# Patient Record
Sex: MALE | Race: WHITE | NOT HISPANIC OR LATINO | Employment: OTHER | URBAN - METROPOLITAN AREA
[De-identification: names, ages, dates, MRNs, and addresses within clinical notes are randomized per-mention and may not be internally consistent; named-entity substitution may affect disease eponyms.]

---

## 2018-08-11 ENCOUNTER — APPOINTMENT (OUTPATIENT)
Dept: CT IMAGING | Facility: HOSPITAL | Age: 73
End: 2018-08-11

## 2018-08-11 ENCOUNTER — HOSPITAL ENCOUNTER (OUTPATIENT)
Facility: HOSPITAL | Age: 73
Setting detail: OBSERVATION
Discharge: HOME | End: 2018-08-14
Attending: EMERGENCY MEDICINE | Admitting: EMERGENCY MEDICINE

## 2018-08-11 DIAGNOSIS — M54.59 INTRACTABLE LOW BACK PAIN: ICD-10-CM

## 2018-08-11 DIAGNOSIS — M54.50 ACUTE MIDLINE LOW BACK PAIN WITHOUT SCIATICA: Primary | ICD-10-CM

## 2018-08-11 LAB — GLUCOSE BLDC GLUCOMTR-MCNC: 97 MG/DL (ref 70–130)

## 2018-08-11 PROCEDURE — G0378 HOSPITAL OBSERVATION PER HR: HCPCS

## 2018-08-11 PROCEDURE — 25010000002 HYDROMORPHONE PER 4 MG: Performed by: EMERGENCY MEDICINE

## 2018-08-11 PROCEDURE — 25010000002 ONDANSETRON PER 1 MG: Performed by: EMERGENCY MEDICINE

## 2018-08-11 PROCEDURE — 25010000002 KETOROLAC TROMETHAMINE PER 15 MG: Performed by: EMERGENCY MEDICINE

## 2018-08-11 PROCEDURE — 82962 GLUCOSE BLOOD TEST: CPT

## 2018-08-11 PROCEDURE — 96374 THER/PROPH/DIAG INJ IV PUSH: CPT

## 2018-08-11 PROCEDURE — 96375 TX/PRO/DX INJ NEW DRUG ADDON: CPT

## 2018-08-11 PROCEDURE — 72131 CT LUMBAR SPINE W/O DYE: CPT

## 2018-08-11 PROCEDURE — 99284 EMERGENCY DEPT VISIT MOD MDM: CPT

## 2018-08-11 RX ORDER — CHLORDIAZEPOXIDE HYDROCHLORIDE AND CLIDINIUM BROMIDE 5; 2.5 MG/1; MG/1
1 CAPSULE ORAL AS NEEDED
COMMUNITY
End: 2018-08-14 | Stop reason: HOSPADM

## 2018-08-11 RX ORDER — OXYCODONE HYDROCHLORIDE AND ACETAMINOPHEN 5; 325 MG/1; MG/1
1 TABLET ORAL EVERY 4 HOURS PRN
Status: DISCONTINUED | OUTPATIENT
Start: 2018-08-11 | End: 2018-08-14 | Stop reason: HOSPADM

## 2018-08-11 RX ORDER — KETOROLAC TROMETHAMINE 30 MG/ML
7.5 INJECTION, SOLUTION INTRAMUSCULAR; INTRAVENOUS ONCE
Status: COMPLETED | OUTPATIENT
Start: 2018-08-11 | End: 2018-08-11

## 2018-08-11 RX ORDER — NAPROXEN SODIUM 220 MG
220 TABLET ORAL 2 TIMES DAILY PRN
COMMUNITY
End: 2018-08-14 | Stop reason: HOSPADM

## 2018-08-11 RX ORDER — KETOROLAC TROMETHAMINE 10 MG/1
10 TABLET, FILM COATED ORAL EVERY 8 HOURS PRN
COMMUNITY
End: 2018-08-14 | Stop reason: HOSPADM

## 2018-08-11 RX ORDER — MULTIPLE VITAMINS W/ MINERALS TAB 9MG-400MCG
1 TAB ORAL DAILY
COMMUNITY

## 2018-08-11 RX ORDER — FENOFIBRATE 160 MG/1
160 TABLET ORAL DAILY
COMMUNITY

## 2018-08-11 RX ORDER — TRAMADOL HYDROCHLORIDE 50 MG/1
50 TABLET ORAL EVERY 6 HOURS PRN
COMMUNITY
End: 2018-08-14 | Stop reason: HOSPADM

## 2018-08-11 RX ORDER — SODIUM CHLORIDE 0.9 % (FLUSH) 0.9 %
10 SYRINGE (ML) INJECTION AS NEEDED
Status: DISCONTINUED | OUTPATIENT
Start: 2018-08-11 | End: 2018-08-14 | Stop reason: HOSPADM

## 2018-08-11 RX ORDER — PANTOPRAZOLE SODIUM 40 MG/1
40 TABLET, DELAYED RELEASE ORAL
Status: DISCONTINUED | OUTPATIENT
Start: 2018-08-12 | End: 2018-08-14 | Stop reason: HOSPADM

## 2018-08-11 RX ORDER — LOSARTAN POTASSIUM 50 MG/1
50 TABLET ORAL DAILY
COMMUNITY

## 2018-08-11 RX ORDER — ONDANSETRON 2 MG/ML
4 INJECTION INTRAMUSCULAR; INTRAVENOUS ONCE
Status: COMPLETED | OUTPATIENT
Start: 2018-08-11 | End: 2018-08-11

## 2018-08-11 RX ORDER — CHLORAL HYDRATE 500 MG
1000 CAPSULE ORAL 2 TIMES DAILY
COMMUNITY

## 2018-08-11 RX ORDER — CETIRIZINE HYDROCHLORIDE 10 MG/1
10 TABLET ORAL DAILY
COMMUNITY
End: 2018-08-14 | Stop reason: HOSPADM

## 2018-08-11 RX ORDER — OXYCODONE HYDROCHLORIDE AND ACETAMINOPHEN 5; 325 MG/1; MG/1
2 TABLET ORAL EVERY 4 HOURS PRN
Status: DISCONTINUED | OUTPATIENT
Start: 2018-08-11 | End: 2018-08-14

## 2018-08-11 RX ORDER — ERGOCALCIFEROL 1.25 MG/1
50000 CAPSULE ORAL WEEKLY
COMMUNITY

## 2018-08-11 RX ADMIN — ONDANSETRON 4 MG: 2 INJECTION INTRAMUSCULAR; INTRAVENOUS at 19:55

## 2018-08-11 RX ADMIN — KETOROLAC TROMETHAMINE 7.5 MG: 30 INJECTION, SOLUTION INTRAMUSCULAR at 21:16

## 2018-08-11 RX ADMIN — HYDROMORPHONE HYDROCHLORIDE 0.5 MG: 1 INJECTION, SOLUTION INTRAMUSCULAR; INTRAVENOUS; SUBCUTANEOUS at 19:57

## 2018-08-11 RX ADMIN — OXYCODONE HYDROCHLORIDE AND ACETAMINOPHEN 1 TABLET: 5; 325 TABLET ORAL at 22:44

## 2018-08-11 NOTE — ED NOTES
C/o back pain  Was seen by PCP on Monday, placed on Toradol, and prescribed PT, has not been to PT yet.   Got up today and experienced pain in his lower back and has not been able to ambulate since.   Denies CP, SOB, or any other s/s at this time.   Patient in NAD at this time,VSS,  patient alert, oriented x4.      Ashley Jacobo, NILE  08/11/18 7475

## 2018-08-12 ENCOUNTER — APPOINTMENT (OUTPATIENT)
Dept: MRI IMAGING | Facility: HOSPITAL | Age: 73
End: 2018-08-12

## 2018-08-12 LAB
GLUCOSE BLDC GLUCOMTR-MCNC: 126 MG/DL (ref 70–130)
GLUCOSE BLDC GLUCOMTR-MCNC: 130 MG/DL (ref 70–130)

## 2018-08-12 PROCEDURE — G0378 HOSPITAL OBSERVATION PER HR: HCPCS

## 2018-08-12 PROCEDURE — A9577 INJ MULTIHANCE: HCPCS | Performed by: HOSPITALIST

## 2018-08-12 PROCEDURE — 82565 ASSAY OF CREATININE: CPT

## 2018-08-12 PROCEDURE — 72158 MRI LUMBAR SPINE W/O & W/DYE: CPT

## 2018-08-12 PROCEDURE — 0 GADOBENATE DIMEGLUMINE 529 MG/ML SOLUTION: Performed by: HOSPITALIST

## 2018-08-12 PROCEDURE — 82962 GLUCOSE BLOOD TEST: CPT

## 2018-08-12 RX ORDER — MELATONIN
1000 DAILY
Status: DISCONTINUED | OUTPATIENT
Start: 2018-08-12 | End: 2018-08-14 | Stop reason: HOSPADM

## 2018-08-12 RX ORDER — ASPIRIN 81 MG/1
81 TABLET, CHEWABLE ORAL DAILY
Status: DISCONTINUED | OUTPATIENT
Start: 2018-08-12 | End: 2018-08-14 | Stop reason: HOSPADM

## 2018-08-12 RX ORDER — KETOROLAC TROMETHAMINE 15 MG/ML
7.5 INJECTION, SOLUTION INTRAMUSCULAR; INTRAVENOUS ONCE
Status: DISCONTINUED | OUTPATIENT
Start: 2018-08-12 | End: 2018-08-12

## 2018-08-12 RX ORDER — LOSARTAN POTASSIUM 50 MG/1
50 TABLET ORAL DAILY
Status: DISCONTINUED | OUTPATIENT
Start: 2018-08-12 | End: 2018-08-14 | Stop reason: HOSPADM

## 2018-08-12 RX ORDER — MULTIPLE VITAMINS W/ MINERALS TAB 9MG-400MCG
1 TAB ORAL DAILY
Status: DISCONTINUED | OUTPATIENT
Start: 2018-08-12 | End: 2018-08-14 | Stop reason: HOSPADM

## 2018-08-12 RX ORDER — ONDANSETRON 2 MG/ML
4 INJECTION INTRAMUSCULAR; INTRAVENOUS EVERY 6 HOURS PRN
Status: DISCONTINUED | OUTPATIENT
Start: 2018-08-12 | End: 2018-08-14

## 2018-08-12 RX ADMIN — LOSARTAN POTASSIUM 50 MG: 50 TABLET, FILM COATED ORAL at 18:51

## 2018-08-12 RX ADMIN — PANTOPRAZOLE SODIUM 40 MG: 40 TABLET, DELAYED RELEASE ORAL at 07:58

## 2018-08-12 RX ADMIN — GADOBENATE DIMEGLUMINE 20 ML: 529 INJECTION, SOLUTION INTRAVENOUS at 12:42

## 2018-08-12 RX ADMIN — ASPIRIN 81 MG: 81 TABLET, CHEWABLE ORAL at 18:51

## 2018-08-12 RX ADMIN — MULTIPLE VITAMINS W/ MINERALS TAB 1 TABLET: TAB at 18:51

## 2018-08-12 RX ADMIN — OXYCODONE HYDROCHLORIDE AND ACETAMINOPHEN 1 TABLET: 5; 325 TABLET ORAL at 22:49

## 2018-08-12 RX ADMIN — VITAMIN D, TAB 1000IU (100/BT) 1000 UNITS: 25 TAB at 18:51

## 2018-08-12 RX ADMIN — OXYCODONE HYDROCHLORIDE AND ACETAMINOPHEN 1 TABLET: 5; 325 TABLET ORAL at 13:45

## 2018-08-12 NOTE — H&P
History and physical    Primary care physician  Dr. Ford    Chief complaint  Severe low back pain    History of present illness  73-year-old white male with history of diabetes mellitus hypertension hyperlipidemia osteoarthritis presented to [facility] with severe low back pain started a month ago in which is getting worse not radiating to the left knee.  Patient unable to walk yesterday due to pain.  Patient denies numbness tingling or weakness.  Patient has no loss of control of bowel or bladder.  Patient evaluated in ER with a CT lumbar spine which showed degenerative disc disease but he can't even move admitted for further workup.  Patient denies any fall, injury.  Patient also denies any fever or chills chest pain shortness of breath abdominal pain nausea vomiting diarrhea.    PAST MEDICAL HISTORY    • Arthritis     • Diabetes mellitus (CMS/[facility])     • Hyperlipidemia        PAST SURGICAL HISTORY  Surgical History   History reviewed. No pertinent surgical history.     FAMILY HISTORY  No family history on file.     SOCIAL HISTORY  Social History   Social History            Social History   • Marital status:        Spouse name: N/A   • Number of children: N/A   • Years of education: N/A          Occupational History   • Not on file.           Social History Main Topics   • Smoking status: Not on file   • Smokeless tobacco: Not on file   • Alcohol use Not on file   • Drug use: Unknown   • Sexual activity: Not on file           Other Topics Concern   • Not on file          Social History Narrative   • No narrative on file         ALLERGIES  Patient has no known allergies.    Home medications reviewed     REVIEW OF SYSTEMS  Review of Systems   Constitutional: Negative for activity change, appetite change and fever.   HENT: Negative for congestion and sore throat.    Eyes: Negative.    Respiratory: Negative for cough and shortness of breath.    Cardiovascular: Negative for chest pain and leg  "swelling.   Gastrointestinal: Negative for abdominal pain, diarrhea and vomiting.   Endocrine: Negative.    Genitourinary: Negative for decreased urine volume and dysuria.   Musculoskeletal: Positive for back pain (Lower ). Negative for neck pain.   Skin: Negative for rash and wound.   Allergic/Immunologic: Negative.    Neurological: Negative for weakness, numbness and headaches.   Hematological: Negative.    Psychiatric/Behavioral: Negative.    All other systems reviewed and are negative.     PHYSICAL EXAM  Blood pressure 116/62, pulse 75, temperature 97.5 °F (36.4 °C), temperature source Oral, resp. rate 18, height 177.8 cm (70\"), weight 96.2 kg (212 lb), SpO2 95 %.    Constitutional: No distress.   Head: Normocephalic and atraumatic.   Eyes: EOM are normal.   Neck: Normal range of motion.   Pulmonary/Chest: No respiratory distress.   Abdominal: There is no tenderness.   Musculoskeletal: He exhibits tenderness. He exhibits no edema.   Lower L spine and SI joint tenderness  Normal strength and sensation in BLE, but difficulty lifting L leg due to pain  No saddle anesthesia   Neurological: He is alert.   Skin: Skin is warm and dry.      LABS  Lab Results (last 24 hours)     Procedure Component Value Units Date/Time    POC Glucose Once [[id number]]  (Normal) Collected:  08/11/18 2203    Specimen:  Blood Updated:  08/11/18 2204     Glucose 97 mg/dL     Narrative:       Meter: [id number] : [id number] Finn GONZALEZ        Imaging Results (last 24 hours)     Procedure Component Value Units Date/Time    CT Lumbar Spine Without Contrast [[id number]] Collected:  08/11/18 2034     Updated:  08/11/18 2041    Narrative:       NONCONTRAST CT SCAN LUMBAR SPINE     CLINICAL HISTORY: lbp (no reported trauma history)     COMPARISON: None.     TECHNIQUE: Radiation dose reduction techniques were utilized, including  automated exposure control and exposure modulation based on body size.  Axial noncontrast images of the lumbar " spine were obtained without  contrast. Sagittal reformatted images were supplemented.     FINDINGS:  No acute vertebral fracture identified on the axial series.  There is minimal S-shaped thoracolumbar scoliosis on the coronal  reformats.     There is 2 mm of anterolisthesis at L3-4. The remaining lumbar vertebrae  are well aligned.  No significant compression deformity or retropulsion.     Moderately advanced multilevel spurring is present, more pronounced at  L2-3, L3-4, and L5-S1. There is moderate disc height loss at L5-S1. Mild  disc height loss at L4-5 with vacuum disc phenomenon. Multilevel facet  arthropathy is present, greatest at L3-4 and L4-5. Mild broad-based  protrusions are present throughout. Mild multilevel canal narrowing is  present. Foraminal stenosis is particularly pronounced at L4-5 and L5-S1  bilaterally.     There are nonobstructing right renal calculi incidentally noted..                      Impression:       1. Multilevel degenerative and arthritic changes as discussed, no acute  lumbar spine fracture.  2. Right nephrolithiasis incidentally noted     This report was finalized on 8/11/2018 8:38 PM by Torrey Atkinson M.D.             Current Facility-Administered Medications:   •  cholecalciferol (VITAMIN D3) tablet 1,000 Units, 1,000 Units, Oral, Daily, Edson No MD  •  insulin aspart (novoLOG) injection 0-7 Units, 0-7 Units, Subcutaneous, 4x Daily With Meals & Nightly, Edson No MD  •  losartan (COZAAR) tablet 50 mg, 50 mg, Oral, Daily, Edson No MD  •  multivitamin with minerals 1 tablet, 1 tablet, Oral, Daily, Edson No MD  •  ondansetron (ZOFRAN) injection 4 mg, 4 mg, Intravenous, Q6H PRN, Edson No MD  •  oxyCODONE-acetaminophen (PERCOCET) 5-325 MG per tablet 1 tablet, 1 tablet, Oral, Q4H PRN, 1 tablet at 08/11/18 9368 **OR** oxyCODONE-acetaminophen (PERCOCET) 5-325 MG per tablet 2 tablet, 2 tablet, Oral, Q4H PRN, Edson No MD  •  pantoprazole (PROTONIX) EC  tablet 40 mg, 40 mg, Oral, QAM Marybel CROWE Aftab, MD, 40 mg at 08/12/18 0758  •  Insert peripheral IV, , , Once **AND** sodium chloride 0.9 % flush 10 mL, 10 mL, Intravenous, PRN, Ashish Quach MD     ASSESSMENT  Severe low back pain radiating to left leg consistent with acute lumbar radiculopathy  Degenerative disc disease rule out herniated disc and lumbar stenosis  Diabetes mellitus  Hypertension  Hyperlipidemia  Osteoarthritis  Gastroesophageal reflux disease    PLAN  Admit  Pain management  Check MRI of the lumbar spine  Continue home medication and adjust the doses  Accu-Chek with sliding scale insulin  Stress ulcer DVT prophylaxis  PTOT  Follow closely further recommendation according to hospital course    AZEEM BLANCA MD

## 2018-08-12 NOTE — PLAN OF CARE
Problem: Patient Care Overview  Goal: Plan of Care Review  Outcome: Ongoing (interventions implemented as appropriate)   08/12/18 1901   Coping/Psychosocial   Plan of Care Reviewed With patient   Plan of Care Review   Progress no change   OTHER   Outcome Summary VSS, intermitten complaints of lower back pain, left side. MRI was completed day. PRN PO pain medication ordered for pain control. continue to assess       Problem: Fall Risk (Adult)  Goal: Absence of Fall  Outcome: Ongoing (interventions implemented as appropriate)      Problem: Pain, Acute (Adult)  Goal: Acceptable Pain Control/Comfort Level  Outcome: Ongoing (interventions implemented as appropriate)      Problem: Skin Injury Risk (Adult)  Goal: Skin Health and Integrity  Outcome: Ongoing (interventions implemented as appropriate)

## 2018-08-12 NOTE — PLAN OF CARE
Problem: Patient Care Overview  Goal: Plan of Care Review  Outcome: Ongoing (interventions implemented as appropriate)   08/12/18 0402   Coping/Psychosocial   Plan of Care Reviewed With patient   Plan of Care Review   Progress no change   OTHER   Outcome Summary New admission from ED with lower back pain. Pt unable to walk; he did not fall at home, but was lowered into a chair after his back went out. Pain well managed with prn po pain medication. Plan for MRI in am. Possible d/c today, as he is observation. VSS       Problem: Fall Risk (Adult)  Goal: Absence of Fall  Outcome: Ongoing (interventions implemented as appropriate)      Problem: Pain, Acute (Adult)  Goal: Acceptable Pain Control/Comfort Level  Outcome: Ongoing (interventions implemented as appropriate)      Problem: Skin Injury Risk (Adult)  Goal: Identify Related Risk Factors and Signs and Symptoms  Outcome: Ongoing (interventions implemented as appropriate)    Goal: Skin Health and Integrity  Outcome: Ongoing (interventions implemented as appropriate)

## 2018-08-12 NOTE — PROGRESS NOTES
Discharge Planning Assessment  [facility]     Patient Name: Capo Gonzales  MRN: [id number]  Today's Date: 8/12/2018    Admit Date: 8/11/2018          Discharge Needs Assessment     Row Name 08/12/18 1054       Living Environment    Lives With spouse    Current Living Arrangements home/apartment/condo    Primary Care Provided by self    Provides Primary Care For no one    Family Caregiver if Needed spouse    Family Caregiver Names Wife Treva [phone]    Quality of Family Relationships helpful    Able to Return to Prior Arrangements yes       Resource/Environmental Concerns    Resource/Environmental Concerns none       Transition Planning    Patient/Family Anticipates Transition to home with family    Patient/Family Anticipated Services at Transition none    Transportation Anticipated family or friend will provide       Discharge Needs Assessment    Concerns to be Addressed denies needs/concerns at this time    Equipment Currently Used at Home none    Anticipated Changes Related to Illness none            Discharge Plan     Row Name 08/12/18 1058       Plan    Plan Return home with spouse    Patient/Family in Agreement with Plan yes    Plan Comments Spoke with patient at bedside.  Patient lives with wife Treva [phone] and states she can assist with driving and as needed, is IADL, uses no DME, has never used HH or been to SNF.  Patient plans to return home at DC and does not anticipate any DC needs.  CCP will follow.  BHumeniuk RN        Destination     No service coordination in this encounter.      Durable Medical Equipment     No service coordination in this encounter.      Dialysis/Infusion     No service coordination in this encounter.      Home Medical Care     No service coordination in this encounter.      Social Care     No service coordination in this encounter.                Demographic Summary     Row Name 08/12/18 1052       General Information    Admission Type observation    Arrived From  home    Referral Source admission list    Reason for Consult discharge planning    Preferred Language English     Used During This Interaction no            Functional Status     Row Name 08/12/18 1054       Functional Status    Usual Activity Tolerance good    Current Activity Tolerance fair       Functional Status, IADL    Medications independent    Meal Preparation assistive person    Housekeeping assistive person    Laundry assistive person    Shopping assistive person       Mental Status    General Appearance WDL WDL       Mental Status Summary    Recent Changes in Mental Status/Cognitive Functioning no changes            Psychosocial    No documentation.           Abuse/Neglect    No documentation.           Legal    No documentation.           Substance Abuse    No documentation.           Patient Forms    No documentation.         Becky S. Humeniuk, RN

## 2018-08-12 NOTE — PROGRESS NOTES
Clinical Pharmacy Services: Medication History    Capo Gonzales is a 73 y.o. male presenting to [facility] for   Chief Complaint   Patient presents with   • Back Pain       He  has no past medical history on file.    Allergies as of 08/11/2018   • (No Known Allergies)       Medication information was obtained from: Patient medication list and patient  Pharmacy and Phone Number: [facility] [phone]    Prior to Admission Medications     Prescriptions Last Dose Informant Patient Reported? Taking?    cetirizine (zyrTEC) 10 MG tablet  Self Yes Yes    Take 10 mg by mouth Daily.    clidinium-chlordiazePOXIDE (LIBRAX) 5-2.5 MG per capsule  Self Yes Yes    Take 1 capsule by mouth As Needed for Indigestion.    fenofibrate 160 MG tablet  Self Yes Yes    Take 160 mg by mouth Daily.    ketorolac (TORADOL) 10 MG tablet  Self Yes Yes    Take 10 mg by mouth Every 8 (Eight) Hours As Needed for Moderate Pain .    losartan (COZAAR) 50 MG tablet  Self Yes Yes    Take 50 mg by mouth Daily.    metFORMIN (GLUCOPHAGE) 500 MG tablet  Self Yes Yes    Take 500 mg by mouth 2 (Two) Times a Day With Meals.    Multiple Vitamins-Minerals (MULTIVITAMIN WITH MINERALS) tablet tablet  Self Yes Yes    Take 1 tablet by mouth Daily.    naproxen sodium (ALEVE) 220 MG tablet  Self Yes Yes    Take 220 mg by mouth 2 (Two) Times a Day As Needed.    Omega-3 Fatty Acids (FISH OIL) 1000 MG capsule capsule  Self Yes Yes    Take 1,000 mg by mouth 2 (Two) Times a Day.    traMADol (ULTRAM) 50 MG tablet  Self Yes Yes    Take 50 mg by mouth Every 6 (Six) Hours As Needed for Moderate Pain .    vitamin D (ERGOCALCIFEROL) 83392 units capsule capsule Past Week Self Yes Yes    Take 50,000 Units by mouth 1 (One) Time Per Week. Takes weekly on Wednesdays            Medication notes: Removed tramadol from active medication list because patient states it does nothing for him and he isn't taking it. Also removed ketorolac (patient did not know why it was added to  the list).    This medication list is complete to the best of my knowledge as of 8/11/2018    Please call if questions.    Manuel Roberts, medication history technician.

## 2018-08-13 LAB
ALBUMIN SERPL-MCNC: 3.9 G/DL (ref 3.5–5.2)
ALBUMIN/GLOB SERPL: 1.6 G/DL
ALP SERPL-CCNC: 33 U/L (ref 39–117)
ALT SERPL W P-5'-P-CCNC: 24 U/L (ref 1–41)
ANION GAP SERPL CALCULATED.3IONS-SCNC: 12.6 MMOL/L
AST SERPL-CCNC: 21 U/L (ref 1–40)
BASOPHILS # BLD AUTO: 0.01 10*3/MM3 (ref 0–0.2)
BASOPHILS NFR BLD AUTO: 0.2 % (ref 0–1.5)
BILIRUB SERPL-MCNC: 0.3 MG/DL (ref 0.1–1.2)
BUN BLD-MCNC: 29 MG/DL (ref 8–23)
BUN/CREAT SERPL: 19.9 (ref 7–25)
CALCIUM SPEC-SCNC: 9.5 MG/DL (ref 8.6–10.5)
CHLORIDE SERPL-SCNC: 106 MMOL/L (ref 98–107)
CHOLEST SERPL-MCNC: 124 MG/DL (ref 0–200)
CO2 SERPL-SCNC: 25.4 MMOL/L (ref 22–29)
CREAT BLD-MCNC: 1.46 MG/DL (ref 0.76–1.27)
CRP SERPL-MCNC: 0.04 MG/DL (ref 0–0.5)
DEPRECATED RDW RBC AUTO: 48.8 FL (ref 37–54)
EOSINOPHIL # BLD AUTO: 0.17 10*3/MM3 (ref 0–0.7)
EOSINOPHIL NFR BLD AUTO: 2.8 % (ref 0.3–6.2)
ERYTHROCYTE [DISTWIDTH] IN BLOOD BY AUTOMATED COUNT: 13.8 % (ref 11.5–14.5)
ERYTHROCYTE [SEDIMENTATION RATE] IN BLOOD: 4 MM/HR (ref 0–20)
GFR SERPL CREATININE-BSD FRML MDRD: 47 ML/MIN/1.73
GLOBULIN UR ELPH-MCNC: 2.5 GM/DL
GLUCOSE BLD-MCNC: 106 MG/DL (ref 65–99)
GLUCOSE BLDC GLUCOMTR-MCNC: 131 MG/DL (ref 70–130)
GLUCOSE BLDC GLUCOMTR-MCNC: 177 MG/DL (ref 70–130)
GLUCOSE BLDC GLUCOMTR-MCNC: 96 MG/DL (ref 70–130)
GLUCOSE BLDC GLUCOMTR-MCNC: 98 MG/DL (ref 70–130)
HBA1C MFR BLD: 5.87 % (ref 4.8–5.6)
HCT VFR BLD AUTO: 42.4 % (ref 40.4–52.2)
HDLC SERPL-MCNC: 28 MG/DL (ref 40–60)
HGB BLD-MCNC: 14.1 G/DL (ref 13.7–17.6)
IMM GRANULOCYTES # BLD: 0.01 10*3/MM3 (ref 0–0.03)
IMM GRANULOCYTES NFR BLD: 0.2 % (ref 0–0.5)
LDLC SERPL CALC-MCNC: 69 MG/DL (ref 0–100)
LDLC/HDLC SERPL: 2.46 {RATIO}
LYMPHOCYTES # BLD AUTO: 2.23 10*3/MM3 (ref 0.9–4.8)
LYMPHOCYTES NFR BLD AUTO: 37.1 % (ref 19.6–45.3)
MCH RBC QN AUTO: 32.8 PG (ref 27–32.7)
MCHC RBC AUTO-ENTMCNC: 33.3 G/DL (ref 32.6–36.4)
MCV RBC AUTO: 98.6 FL (ref 79.8–96.2)
MONOCYTES # BLD AUTO: 0.81 10*3/MM3 (ref 0.2–1.2)
MONOCYTES NFR BLD AUTO: 13.5 % (ref 5–12)
NEUTROPHILS # BLD AUTO: 2.79 10*3/MM3 (ref 1.9–8.1)
NEUTROPHILS NFR BLD AUTO: 46.4 % (ref 42.7–76)
NT-PROBNP SERPL-MCNC: 13.9 PG/ML (ref 0–900)
PLATELET # BLD AUTO: 192 10*3/MM3 (ref 140–500)
PMV BLD AUTO: 10.3 FL (ref 6–12)
POTASSIUM BLD-SCNC: 4.4 MMOL/L (ref 3.5–5.2)
PROT SERPL-MCNC: 6.4 G/DL (ref 6–8.5)
RBC # BLD AUTO: 4.3 10*6/MM3 (ref 4.6–6)
SODIUM BLD-SCNC: 144 MMOL/L (ref 136–145)
T3FREE SERPL-MCNC: 2.98 PG/ML (ref 2–4.4)
T4 FREE SERPL-MCNC: 1.22 NG/DL (ref 0.93–1.7)
TRIGL SERPL-MCNC: 136 MG/DL (ref 0–150)
TSH SERPL DL<=0.05 MIU/L-ACNC: 6.83 MIU/ML (ref 0.27–4.2)
VLDLC SERPL-MCNC: 27.2 MG/DL (ref 5–40)
WBC NRBC COR # BLD: 6.01 10*3/MM3 (ref 4.5–10.7)

## 2018-08-13 PROCEDURE — 63710000001 INSULIN ASPART PER 5 UNITS: Performed by: HOSPITALIST

## 2018-08-13 PROCEDURE — 63710000001 METHYLPREDNISOLONE 4 MG TABLET THERAPY PACK 21 EACH DISP PACK: Performed by: NURSE PRACTITIONER

## 2018-08-13 PROCEDURE — G0378 HOSPITAL OBSERVATION PER HR: HCPCS

## 2018-08-13 PROCEDURE — 85025 COMPLETE CBC W/AUTO DIFF WBC: CPT | Performed by: HOSPITALIST

## 2018-08-13 PROCEDURE — 99204 OFFICE O/P NEW MOD 45 MIN: CPT | Performed by: NURSE PRACTITIONER

## 2018-08-13 PROCEDURE — 80061 LIPID PANEL: CPT | Performed by: HOSPITALIST

## 2018-08-13 PROCEDURE — 99220 PR INITIAL OBSERVATION CARE/DAY 70 MINUTES: CPT | Performed by: ORTHOPAEDIC SURGERY

## 2018-08-13 PROCEDURE — 84443 ASSAY THYROID STIM HORMONE: CPT | Performed by: HOSPITALIST

## 2018-08-13 PROCEDURE — 80053 COMPREHEN METABOLIC PANEL: CPT | Performed by: HOSPITALIST

## 2018-08-13 PROCEDURE — 82962 GLUCOSE BLOOD TEST: CPT

## 2018-08-13 PROCEDURE — 96375 TX/PRO/DX INJ NEW DRUG ADDON: CPT

## 2018-08-13 PROCEDURE — 83880 ASSAY OF NATRIURETIC PEPTIDE: CPT | Performed by: HOSPITALIST

## 2018-08-13 PROCEDURE — 83036 HEMOGLOBIN GLYCOSYLATED A1C: CPT | Performed by: HOSPITALIST

## 2018-08-13 PROCEDURE — 85652 RBC SED RATE AUTOMATED: CPT | Performed by: HOSPITALIST

## 2018-08-13 PROCEDURE — 86140 C-REACTIVE PROTEIN: CPT | Performed by: HOSPITALIST

## 2018-08-13 PROCEDURE — 25010000002 DEXAMETHASONE PER 1 MG: Performed by: NURSE PRACTITIONER

## 2018-08-13 PROCEDURE — 84481 FREE ASSAY (FT-3): CPT | Performed by: HOSPITALIST

## 2018-08-13 PROCEDURE — 96361 HYDRATE IV INFUSION ADD-ON: CPT

## 2018-08-13 PROCEDURE — 84439 ASSAY OF FREE THYROXINE: CPT | Performed by: HOSPITALIST

## 2018-08-13 RX ORDER — METHYLPREDNISOLONE 4 MG/1
8 TABLET ORAL
Status: DISCONTINUED | OUTPATIENT
Start: 2018-08-14 | End: 2018-08-14 | Stop reason: HOSPADM

## 2018-08-13 RX ORDER — METHYLPREDNISOLONE 4 MG/1
4 TABLET ORAL
Status: DISCONTINUED | OUTPATIENT
Start: 2018-08-18 | End: 2018-08-14 | Stop reason: HOSPADM

## 2018-08-13 RX ORDER — METHYLPREDNISOLONE 4 MG/1
4 TABLET ORAL
Status: COMPLETED | OUTPATIENT
Start: 2018-08-13 | End: 2018-08-13

## 2018-08-13 RX ORDER — METHYLPREDNISOLONE 4 MG/1
4 TABLET ORAL
Status: DISCONTINUED | OUTPATIENT
Start: 2018-08-16 | End: 2018-08-14 | Stop reason: HOSPADM

## 2018-08-13 RX ORDER — METHYLPREDNISOLONE 4 MG/1
4 TABLET ORAL
Status: DISCONTINUED | OUTPATIENT
Start: 2018-08-14 | End: 2018-08-14 | Stop reason: HOSPADM

## 2018-08-13 RX ORDER — METHYLPREDNISOLONE 4 MG/1
4 TABLET ORAL 2 TIMES DAILY
Status: DISCONTINUED | OUTPATIENT
Start: 2018-08-17 | End: 2018-08-14 | Stop reason: HOSPADM

## 2018-08-13 RX ORDER — SODIUM CHLORIDE 450 MG/100ML
75 INJECTION, SOLUTION INTRAVENOUS CONTINUOUS
Status: DISCONTINUED | OUTPATIENT
Start: 2018-08-13 | End: 2018-08-14

## 2018-08-13 RX ORDER — METHYLPREDNISOLONE 4 MG/1
8 TABLET ORAL
Status: COMPLETED | OUTPATIENT
Start: 2018-08-13 | End: 2018-08-13

## 2018-08-13 RX ORDER — METHYLPREDNISOLONE 4 MG/1
4 TABLET ORAL
Status: DISCONTINUED | OUTPATIENT
Start: 2018-08-15 | End: 2018-08-14 | Stop reason: HOSPADM

## 2018-08-13 RX ORDER — DEXAMETHASONE SODIUM PHOSPHATE 4 MG/ML
6 VIAL (ML) INJECTION ONCE
Status: COMPLETED | OUTPATIENT
Start: 2018-08-13 | End: 2018-08-13

## 2018-08-13 RX ADMIN — DEXAMETHASONE SODIUM PHOSPHATE 6 MG: 4 INJECTION, SOLUTION INTRAMUSCULAR; INTRAVENOUS at 17:25

## 2018-08-13 RX ADMIN — METHYLPREDNISOLONE 8 MG: 4 TABLET ORAL at 17:26

## 2018-08-13 RX ADMIN — METHYLPREDNISOLONE 4 MG: 4 TABLET ORAL at 18:04

## 2018-08-13 RX ADMIN — INSULIN ASPART 2 UNITS: 100 INJECTION, SOLUTION INTRAVENOUS; SUBCUTANEOUS at 20:54

## 2018-08-13 RX ADMIN — PANTOPRAZOLE SODIUM 40 MG: 40 TABLET, DELAYED RELEASE ORAL at 06:50

## 2018-08-13 RX ADMIN — MULTIPLE VITAMINS W/ MINERALS TAB 1 TABLET: TAB at 08:54

## 2018-08-13 RX ADMIN — ASPIRIN 81 MG: 81 TABLET, CHEWABLE ORAL at 08:54

## 2018-08-13 RX ADMIN — METHYLPREDNISOLONE 4 MG: 4 TABLET ORAL at 18:51

## 2018-08-13 RX ADMIN — SODIUM CHLORIDE 75 ML/HR: 4.5 INJECTION, SOLUTION INTRAVENOUS at 16:33

## 2018-08-13 RX ADMIN — LOSARTAN POTASSIUM 50 MG: 50 TABLET, FILM COATED ORAL at 08:55

## 2018-08-13 RX ADMIN — METHYLPREDNISOLONE 8 MG: 4 TABLET ORAL at 20:54

## 2018-08-13 RX ADMIN — VITAMIN D, TAB 1000IU (100/BT) 1000 UNITS: 25 TAB at 08:54

## 2018-08-13 NOTE — PROGRESS NOTES
"Daily progress note    Chief complaint  Doing little better  Still with severe pain but no numbness tingling or weakness    History of present illness  73-year-old white male with history of diabetes mellitus hypertension hyperlipidemia osteoarthritis presented to [facility] with severe low back pain started a month ago in which is getting worse not radiating to the left knee.  Patient unable to walk yesterday due to pain.  Patient denies numbness tingling or weakness.  Patient has no loss of control of bowel or bladder.  Patient evaluated in ER with a CT lumbar spine which showed degenerative disc disease but he can't even move admitted for further workup.  Patient denies any fall, injury.  Patient also denies any fever or chills chest pain shortness of breath abdominal pain nausea vomiting diarrhea.     REVIEW OF SYSTEMS  Review of Systems   Constitutional: Negative for activity change, appetite change and fever.   HENT: Negative for congestion and sore throat.    Eyes: Negative.    Respiratory: Negative for cough and shortness of breath.    Cardiovascular: Negative for chest pain and leg swelling.   Gastrointestinal: Negative for abdominal pain, diarrhea and vomiting.   Endocrine: Negative.    Genitourinary: Negative for decreased urine volume and dysuria.   Musculoskeletal: Positive for back pain (Lower ). Negative for neck pain.   Skin: Negative for rash and wound.   Allergic/Immunologic: Negative.    Neurological: Negative for weakness, numbness and headaches.   Hematological: Negative.    Psychiatric/Behavioral: Negative.    All other systems reviewed and are negative.     PHYSICAL EXAM  Blood pressure 121/71, pulse 84, temperature 98.1 °F (36.7 °C), temperature source Oral, resp. rate 17, height 177.8 cm (70\"), weight 96.2 kg (212 lb), SpO2 92 %.    Constitutional: No distress.   Head: Normocephalic and atraumatic.   Eyes: EOM are normal.   Neck: Normal range of motion.   Pulmonary/Chest: No " respiratory distress.   Abdominal: There is no tenderness.   Musculoskeletal: He exhibits tenderness. He exhibits no edema.   Lower L spine and SI joint tenderness  Normal strength and sensation in BLE, but difficulty lifting L leg due to pain  No saddle anesthesia   Neurological: He is alert.   Skin: Skin is warm and dry.      LABS  Lab Results (last 24 hours)     Procedure Component Value Units Date/Time    POC Glucose Once [[id number]]  (Normal) Collected:  08/13/18 1103    Specimen:  Blood Updated:  08/13/18 1105     Glucose 98 mg/dL     Narrative:       Meter: [id number] : [id number] Daniel Zenaida NA    POC Glucose Once [[id number]]  (Normal) Collected:  08/13/18 0722    Specimen:  Blood Updated:  08/13/18 0724     Glucose 96 mg/dL     Narrative:       Meter: [id number] : [id number] Daniel Zenaida NA    Sedimentation Rate [[id number]]  (Normal) Collected:  08/13/18 0543    Specimen:  Blood Updated:  08/13/18 0722     Sed Rate 4 mm/hr     BNP [[id number]]  (Normal) Collected:  08/13/18 0543    Specimen:  Blood Updated:  08/13/18 0710     proBNP 13.9 pg/mL     Narrative:       Among patients with dyspnea, NT-proBNP is highly sensitive for the detection of acute congestive heart failure. In addition NT-proBNP of <300 pg/ml effectively rules out acute congestive heart failure with 99% negative predictive value.    TSH [[id number]]  (Abnormal) Collected:  08/13/18 0543    Specimen:  Blood Updated:  08/13/18 0710     TSH 6.830 (H) mIU/mL     Comprehensive Metabolic Panel [[id number]]  (Abnormal) Collected:  08/13/18 0543    Specimen:  Blood Updated:  08/13/18 0705     Glucose 106 (H) mg/dL      BUN 29 (H) mg/dL      Creatinine 1.46 (H) mg/dL      Sodium 144 mmol/L      Potassium 4.4 mmol/L      Chloride 106 mmol/L      CO2 25.4 mmol/L      Calcium 9.5 mg/dL      Total Protein 6.4 g/dL      Albumin 3.90 g/dL      ALT (SGPT) 24 U/L      AST (SGOT) 21 U/L      Alkaline Phosphatase 33 (L) U/L      Total Bilirubin 0.3  mg/dL      eGFR Non  Amer 47 (L) mL/min/1.73      Globulin 2.5 gm/dL      A/G Ratio 1.6 g/dL      BUN/Creatinine Ratio 19.9     Anion Gap 12.6 mmol/L     Narrative:       The MDRD GFR formula is only valid for adults with stable renal function between ages 18 and 70.    Lipid Panel [[id number]]  (Abnormal) Collected:  08/13/18 0543    Specimen:  Blood Updated:  08/13/18 0705     Total Cholesterol 124 mg/dL      Triglycerides 136 mg/dL      HDL Cholesterol 28 (L) mg/dL      LDL Cholesterol  69 mg/dL      VLDL Cholesterol 27.2 mg/dL      LDL/HDL Ratio 2.46    Narrative:       Cholesterol Reference Ranges  (U.S. Department of Health and Human Services ATP III Classifications)    Desirable          <200 mg/dL  Borderline High    200-239 mg/dL  High Risk          >240 mg/dL      Triglyceride Reference Ranges  (U.S. Department of Health and Human Services ATP III Classifications)    Normal           <150 mg/dL  Borderline High  150-199 mg/dL  High             200-499 mg/dL  Very High        >500 mg/dL    HDL Reference Ranges  (U.S. Department of Health and Human Services ATP III Classifcations)    Low     <40 mg/dl (major risk factor for CHD)  High    >60 mg/dl ('negative' risk factor for CHD)        LDL Reference Ranges  (U.S. Department of Health and Human Services ATP III Classifcations)    Optimal          <100 mg/dL  Near Optimal     100-129 mg/dL  Borderline High  130-159 mg/dL  High             160-189 mg/dL  Very High        >189 mg/dL    C-reactive Protein [[id number]]  (Normal) Collected:  08/13/18 0543    Specimen:  Blood Updated:  08/13/18 0705     C-Reactive Protein 0.04 mg/dL     Hemoglobin A1c [[id number]]  (Abnormal) Collected:  08/13/18 0543    Specimen:  Blood Updated:  08/13/18 0634     Hemoglobin A1C 5.87 (H) %     Narrative:       Hemoglobin A1C Ranges:    Increased Risk for Diabetes  5.7% to 6.4%  Diabetes                     >= 6.5%  Diabetic Goal                < 7.0%    CBC & Differential  [[id number]] Collected:  08/13/18 0543    Specimen:  Blood Updated:  08/13/18 0632    Narrative:       The following orders were created for panel order CBC & Differential.  Procedure                               Abnormality         Status                     ---------                               -----------         ------                     CBC Auto Differential[[id number]]        Abnormal            Final result                 Please view results for these tests on the individual orders.    CBC Auto Differential [[id number]]  (Abnormal) Collected:  08/13/18 0543    Specimen:  Blood Updated:  08/13/18 0632     WBC 6.01 10*3/mm3      RBC 4.30 (L) 10*6/mm3      Hemoglobin 14.1 g/dL      Hematocrit 42.4 %      MCV 98.6 (H) fL      MCH 32.8 (H) pg      MCHC 33.3 g/dL      RDW 13.8 %      RDW-SD 48.8 fl      MPV 10.3 fL      Platelets 192 10*3/mm3      Neutrophil % 46.4 %      Lymphocyte % 37.1 %      Monocyte % 13.5 (H) %      Eosinophil % 2.8 %      Basophil % 0.2 %      Immature Grans % 0.2 %      Neutrophils, Absolute 2.79 10*3/mm3      Lymphocytes, Absolute 2.23 10*3/mm3      Monocytes, Absolute 0.81 10*3/mm3      Eosinophils, Absolute 0.17 10*3/mm3      Basophils, Absolute 0.01 10*3/mm3      Immature Grans, Absolute 0.01 10*3/mm3     POC Glucose Once [[id number]]  (Normal) Collected:  08/12/18 2048    Specimen:  Blood Updated:  08/12/18 2109     Glucose 130 mg/dL     Narrative:       Meter: [id number] : [id number] Delfino OLSEN    POC Glucose Once [[id number]]  (Normal) Collected:  08/12/18 1759    Specimen:  Blood Updated:  08/12/18 1800     Glucose 126 mg/dL     Narrative:       Meter: [id number] : [id number] Beto GONZALEZ        Imaging Results (last 72 hours)     Procedure Component Value Units Date/Time    MRI Lumbar Spine With & Without Contrast [[id number]] Collected:  08/12/18 1334     Updated:  08/12/18 1359    Narrative:       MR SCAN OF THE LUMBAR SPINE WITHOUT AND WITH INTRAVENOUS  CONTRAST     HISTORY: Low back pain extending into left leg. Sudden onset after  bending over.     FINDINGS:  The MR scan was performed with sagittal and axial images and  includes T1 images without and with intravenous contrast. The following  findings are present:  1. There are numerous cysts within the partially visualized kidneys,  particularly on the right. The appearance is concerning for polycystic  renal disease and further clinical correlation is recommended.  2. The conus appears normal. There is no significant disc or bony  abnormality at T11-12 or T12-L1. At L1-2, there is no disc abnormality.  There is slight facet hypertrophy without central or foraminal  encroachment.  3. At L2-3, there is no disc abnormality. There is moderate facet  hypertrophy without central or foraminal encroachment.  4. At L3-4, there is a combination of very severe facet spurring and  slight anterior subluxation of L3 measuring 4 mm with unroofing of the  disc and generalized disc bulge. This combination produces moderate  central stenosis. There is no foraminal encroachment.  5. At L4-5, there is mild to moderate broad-based disc bulge combined  with considerable facet spurring. There is no central stenosis, but the  degree of facet spurring causes some slight bilateral foraminal  encroachment.  6. At L5-S1, there is a slight central disc bulge and there is slight  facet hypertrophy and spurring. There is no central or foraminal  encroachment.     This report was finalized on 8/12/2018 1:56 PM by Dr. Brian Obregon M.D.       CT Lumbar Spine Without Contrast [[id number]] Collected:  08/11/18 2034     Updated:  08/11/18 2041    Narrative:       NONCONTRAST CT SCAN LUMBAR SPINE     CLINICAL HISTORY: lbp (no reported trauma history)     COMPARISON: None.     TECHNIQUE: Radiation dose reduction techniques were utilized, including  automated exposure control and exposure modulation based on body size.  Axial noncontrast images of  the lumbar spine were obtained without  contrast. Sagittal reformatted images were supplemented.     FINDINGS:  No acute vertebral fracture identified on the axial series.  There is minimal S-shaped thoracolumbar scoliosis on the coronal  reformats.     There is 2 mm of anterolisthesis at L3-4. The remaining lumbar vertebrae  are well aligned.  No significant compression deformity or retropulsion.     Moderately advanced multilevel spurring is present, more pronounced at  L2-3, L3-4, and L5-S1. There is moderate disc height loss at L5-S1. Mild  disc height loss at L4-5 with vacuum disc phenomenon. Multilevel facet  arthropathy is present, greatest at L3-4 and L4-5. Mild broad-based  protrusions are present throughout. Mild multilevel canal narrowing is  present. Foraminal stenosis is particularly pronounced at L4-5 and L5-S1  bilaterally.     There are nonobstructing right renal calculi incidentally noted..                      Impression:       1. Multilevel degenerative and arthritic changes as discussed, no acute  lumbar spine fracture.  2. Right nephrolithiasis incidentally noted     This report was finalized on 8/11/2018 8:38 PM by Torrey Atkinson M.D.             Current Facility-Administered Medications:   •  aspirin chewable tablet 81 mg, 81 mg, Oral, Daily, Edson No MD, 81 mg at 08/13/18 0854  •  cholecalciferol (VITAMIN D3) tablet 1,000 Units, 1,000 Units, Oral, Daily, Edson No MD, 1,000 Units at 08/13/18 0854  •  insulin aspart (novoLOG) injection 0-7 Units, 0-7 Units, Subcutaneous, 4x Daily With Meals & Nightly, Edson No MD  •  losartan (COZAAR) tablet 50 mg, 50 mg, Oral, Daily, Edson No MD, 50 mg at 08/13/18 0855  •  multivitamin with minerals 1 tablet, 1 tablet, Oral, Daily, Edson No MD, 1 tablet at 08/13/18 0854  •  ondansetron (ZOFRAN) injection 4 mg, 4 mg, Intravenous, Q6H PRN, Edson No MD  •  oxyCODONE-acetaminophen (PERCOCET) 5-325 MG per tablet 1 tablet, 1 tablet,  Oral, Q4H PRN, 1 tablet at 08/12/18 8489 **OR** oxyCODONE-acetaminophen (PERCOCET) 5-325 MG per tablet 2 tablet, 2 tablet, Oral, Q4H PRN, Azeem No MD  •  pantoprazole (PROTONIX) EC tablet 40 mg, 40 mg, Oral, QAM AC, Azeem No MD, 40 mg at 08/13/18 0650  •  Insert peripheral IV, , , Once **AND** sodium chloride 0.9 % flush 10 mL, 10 mL, Intravenous, PRN, Ashish Quach MD     ASSESSMENT  Severe low back pain radiating to left leg withL4-L5 disc bulge and lumbar stenosis  Degenerative disc disease   Diabetes mellitus  Hypertension  Hyperlipidemia  Osteoarthritis  Gastroesophageal reflux disease    PLAN  CPM  Pain management  Spine surgery consult  Continue home medication and adjust the doses  Accu-Chek with sliding scale insulin  Stress ulcer DVT prophylaxis  PTOT  Follow closely further recommendation according to hospital course    AZEEM NO MD

## 2018-08-13 NOTE — CONSULTS
"Patient name: Capo Gonzales  Referring Provider: JESSE Albrecht  Reason for Consultation: low back pain    Patient Care Team:  Andreas Ford MD as PCP - General (Family Medicine)    Chief complaint: Intractable low back pain    Subjective .     History of present illness:    Patient is a 73 y.o. right handed male who presents with progressively worsening and intractable low back pain that started approximately 1 month ago.  He has been followed by his PCP for the issue and has undergone multiple x-rays.  He was recently referred to physical therapy, which she has not started.  Yesterday afternoon, he was bending over changing the oil in his car when the back pain worsened.  He reports that it was the worst pain he has ever felt, \"worse than passing kidney stones.\" He came to the ER for further evaluation.    He denies any pain, numbness, tingling or weakness radiating down either leg.  Pain radiates from the left low back into the left buttock.  He reports it is currently much better now than it was when he came to the ER.  He is able to walk without difficulty.  He denies any bowel or bladder incontinence.  He was taking Aleve at home which did help until the pain became more severe.      /71 (BP Location: Left arm, Patient Position: Sitting)   Pulse 84   Temp 98.1 °F (36.7 °C) (Oral)   Resp 17   Ht 177.8 cm (70\")   Wt 96.2 kg (212 lb)   SpO2 92%   BMI 30.42 kg/m²       Review of Systems  Review of Systems   Constitutional: Positive for activity change.   HENT: Negative.    Eyes: Negative.    Respiratory: Negative.    Cardiovascular: Negative.    Gastrointestinal: Negative.    Endocrine: Negative.    Genitourinary: Negative.    Musculoskeletal: Positive for arthralgias, back pain, gait problem and myalgias.   Skin: Negative.    Neurological: Negative for tremors and weakness.   Hematological: Negative.    Psychiatric/Behavioral: Negative.        History  PAST MEDICAL HISTORY  Past Medical " History:   Diagnosis Date   • Arthritis    • Diabetes mellitus (CMS/HCC)    • Hyperlipidemia    • Hypertension        PAST SURGICAL HISTORY  History reviewed. No pertinent surgical history.    FAMILY HISTORY  History reviewed. No pertinent family history.    SOCIAL HISTORY  Social History   Substance Use Topics   • Smoking status: Never Smoker   • Smokeless tobacco: Never Used   • Alcohol use No       retired      Allergies:  Patient has no known allergies.    MEDICATIONS:  Prescriptions Prior to Admission   Medication Sig Dispense Refill Last Dose   • cetirizine (zyrTEC) 10 MG tablet Take 10 mg by mouth Daily.      • clidinium-chlordiazePOXIDE (LIBRAX) 5-2.5 MG per capsule Take 1 capsule by mouth As Needed for Indigestion.      • fenofibrate 160 MG tablet Take 160 mg by mouth Daily.      • ketorolac (TORADOL) 10 MG tablet Take 10 mg by mouth Every 8 (Eight) Hours As Needed for Moderate Pain .      • losartan (COZAAR) 50 MG tablet Take 50 mg by mouth Daily.      • metFORMIN (GLUCOPHAGE) 500 MG tablet Take 500 mg by mouth 2 (Two) Times a Day With Meals.      • Multiple Vitamins-Minerals (MULTIVITAMIN WITH MINERALS) tablet tablet Take 1 tablet by mouth Daily.      • naproxen sodium (ALEVE) 220 MG tablet Take 220 mg by mouth 2 (Two) Times a Day As Needed.      • Omega-3 Fatty Acids (FISH OIL) 1000 MG capsule capsule Take 1,000 mg by mouth 2 (Two) Times a Day.      • traMADol (ULTRAM) 50 MG tablet Take 50 mg by mouth Every 6 (Six) Hours As Needed for Moderate Pain .      • vitamin D (ERGOCALCIFEROL) 06727 units capsule capsule Take 50,000 Units by mouth 1 (One) Time Per Week. Takes weekly on Wednesdays   Past Week at Unknown time       Objective     Results Review:  LABS:    Admission on 08/11/2018   Component Date Value Ref Range Status   • Glucose 08/11/2018 97  70 - 130 mg/dL Final   • Glucose 08/12/2018 126  70 - 130 mg/dL Final   • Glucose 08/12/2018 130  70 - 130 mg/dL Final   • Glucose 08/13/2018 106* 65 -  99 mg/dL Final   • BUN 08/13/2018 29* 8 - 23 mg/dL Final   • Creatinine 08/13/2018 1.46* 0.76 - 1.27 mg/dL Final   • Sodium 08/13/2018 144  136 - 145 mmol/L Final   • Potassium 08/13/2018 4.4  3.5 - 5.2 mmol/L Final   • Chloride 08/13/2018 106  98 - 107 mmol/L Final   • CO2 08/13/2018 25.4  22.0 - 29.0 mmol/L Final   • Calcium 08/13/2018 9.5  8.6 - 10.5 mg/dL Final   • Total Protein 08/13/2018 6.4  6.0 - 8.5 g/dL Final   • Albumin 08/13/2018 3.90  3.50 - 5.20 g/dL Final   • ALT (SGPT) 08/13/2018 24  1 - 41 U/L Final   • AST (SGOT) 08/13/2018 21  1 - 40 U/L Final   • Alkaline Phosphatase 08/13/2018 33* 39 - 117 U/L Final   • Total Bilirubin 08/13/2018 0.3  0.1 - 1.2 mg/dL Final   • eGFR Non African Amer 08/13/2018 47* >60 mL/min/1.73 Final   • Globulin 08/13/2018 2.5  gm/dL Final   • A/G Ratio 08/13/2018 1.6  g/dL Final   • BUN/Creatinine Ratio 08/13/2018 19.9  7.0 - 25.0 Final   • Anion Gap 08/13/2018 12.6  mmol/L Final   • proBNP 08/13/2018 13.9  0.0 - 900.0 pg/mL Final   • TSH 08/13/2018 6.830* 0.270 - 4.200 mIU/mL Final   • Total Cholesterol 08/13/2018 124  0 - 200 mg/dL Final   • Triglycerides 08/13/2018 136  0 - 150 mg/dL Final   • HDL Cholesterol 08/13/2018 28* 40 - 60 mg/dL Final   • LDL Cholesterol  08/13/2018 69  0 - 100 mg/dL Final   • VLDL Cholesterol 08/13/2018 27.2  5 - 40 mg/dL Final   • LDL/HDL Ratio 08/13/2018 2.46   Final   • Hemoglobin A1C 08/13/2018 5.87* 4.80 - 5.60 % Final   • Sed Rate 08/13/2018 4  0 - 20 mm/hr Final   • C-Reactive Protein 08/13/2018 0.04  0.00 - 0.50 mg/dL Final   • WBC 08/13/2018 6.01  4.50 - 10.70 10*3/mm3 Final   • RBC 08/13/2018 4.30* 4.60 - 6.00 10*6/mm3 Final   • Hemoglobin 08/13/2018 14.1  13.7 - 17.6 g/dL Final   • Hematocrit 08/13/2018 42.4  40.4 - 52.2 % Final   • MCV 08/13/2018 98.6* 79.8 - 96.2 fL Final   • MCH 08/13/2018 32.8* 27.0 - 32.7 pg Final   • MCHC 08/13/2018 33.3  32.6 - 36.4 g/dL Final   • RDW 08/13/2018 13.8  11.5 - 14.5 % Final   • RDW-SD 08/13/2018  48.8  37.0 - 54.0 fl Final   • MPV 08/13/2018 10.3  6.0 - 12.0 fL Final   • Platelets 08/13/2018 192  140 - 500 10*3/mm3 Final   • Neutrophil % 08/13/2018 46.4  42.7 - 76.0 % Final   • Lymphocyte % 08/13/2018 37.1  19.6 - 45.3 % Final   • Monocyte % 08/13/2018 13.5* 5.0 - 12.0 % Final   • Eosinophil % 08/13/2018 2.8  0.3 - 6.2 % Final   • Basophil % 08/13/2018 0.2  0.0 - 1.5 % Final   • Immature Grans % 08/13/2018 0.2  0.0 - 0.5 % Final   • Neutrophils, Absolute 08/13/2018 2.79  1.90 - 8.10 10*3/mm3 Final   • Lymphocytes, Absolute 08/13/2018 2.23  0.90 - 4.80 10*3/mm3 Final   • Monocytes, Absolute 08/13/2018 0.81  0.20 - 1.20 10*3/mm3 Final   • Eosinophils, Absolute 08/13/2018 0.17  0.00 - 0.70 10*3/mm3 Final   • Basophils, Absolute 08/13/2018 0.01  0.00 - 0.20 10*3/mm3 Final   • Immature Grans, Absolute 08/13/2018 0.01  0.00 - 0.03 10*3/mm3 Final   • Glucose 08/13/2018 96  70 - 130 mg/dL Final   • Glucose 08/13/2018 98  70 - 130 mg/dL Final       DIAGNOSTICS:  MRI of the lumbar spine with and without contrast reveals significant degenerative arthritic changes throughout.  However, there is no significant neuroforaminal narrowing at any level resulting in nerve impingement    Results Review:   I reviewed the patient's new clinical results.  I personally viewed and interpreted the patient's MRI lumbar spine with and without contrast, as reviewed and discussed with Dr. Sifuentes      Vital Signs   Temp:  [98.1 °F (36.7 °C)-98.4 °F (36.9 °C)] 98.1 °F (36.7 °C)  Heart Rate:  [75-84] 84  Resp:  [17-18] 17  BP: (102-128)/(52-71) 121/71    Physical Exam:  Physical Exam   Constitutional: He is oriented to person, place, and time. Vital signs are normal. He appears well-developed and well-nourished. He is cooperative.   Very pleasant older gentleman   HENT:   Head: Normocephalic and atraumatic.   Eyes: Pupils are equal, round, and reactive to light. EOM are normal.   Neck: Neck supple.   Cardiovascular: Intact distal pulses.     Pulmonary/Chest: Effort normal and breath sounds normal.   Abdominal: Soft.   Musculoskeletal: Normal range of motion. He exhibits tenderness (Minimal tenderness to firm palpation left sciatic notch, nontender diffuse lumbar spine and SI joint). He exhibits no edema or deformity.        Lumbar back: He exhibits normal range of motion, no bony tenderness, no swelling, no edema, no laceration and no pain.   Strength equal bilateral lower extremities with normal muscle bulk and tone  Severe, reproducible pain with left hip abduction and abduction- positive Wilver's  Able to move and turn easily in bed without obvious discomfort     Neurological: He is alert and oriented to person, place, and time. He has normal strength. He displays no tremor and normal reflexes. No cranial nerve deficit or sensory deficit. He exhibits normal muscle tone. Coordination normal. GCS eye subscore is 4. GCS verbal subscore is 5. GCS motor subscore is 6.   Reflex Scores:       Patellar reflexes are 2+ on the right side and 2+ on the left side.       Achilles reflexes are 2+ on the right side and 2+ on the left side.  Sensory intact bilateral lower extremities to soft touch  Normal proprioception  Normal deep tendon reflexes  Negative clonus  Normal motor exam   Skin: Skin is warm and dry.   Psychiatric: He has a normal mood and affect. His behavior is normal. Judgment and thought content normal.   Vitals reviewed.    Neurologic Exam     Mental Status   Oriented to person, place, and time.     Cranial Nerves     CN III, IV, VI   Pupils are equal, round, and reactive to light.  Extraocular motions are normal.     Motor Exam     Strength   Strength 5/5 throughout.     Gait, Coordination, and Reflexes     Reflexes   Right patellar: 2+  Left patellar: 2+  Right achilles: 2+  Left achilles: 2+      Assessment/Plan     Active Problems:    Acute midline low back pain without sciatica      PLAN: He presents with 1 month of waxing and waning left  sided low back and buttock pain that progressively worsened yesterday.  MRI of the lumbar spine shows significant arthritis throughout, but no evidence of nerve impingement.  He has no myelopathic findings on exam.  He has no radiation of pain into his legs.  No need for any neurosurgical intervention.  I have ordered one dose of IV steroids followed by a Medrol Dosepak to help with any inflammatory discomfort.    As noted above, he does have reproducible pain with a left hip abduction and abduction that would likely warrant an orthopedic surgery evaluation.  I will also order physical therapy.    We will sign off.  No need for outpatient follow-up.  Thank you for the consult.    I discussed the patients findings and my recommendations with patient, nursing staff and Dr Maria Villanueva, JESSE  08/13/18  3:09 PM

## 2018-08-13 NOTE — PLAN OF CARE
Problem: Patient Care Overview  Goal: Plan of Care Review  Outcome: Ongoing (interventions implemented as appropriate)   08/13/18 0315   Coping/Psychosocial   Plan of Care Reviewed With patient   Plan of Care Review   Progress no change   OTHER   Outcome Summary Pt c/o lower back pain, Percocet given. Pt in RA, VSS. Will continue to monitor.      Goal: Individualization and Mutuality  Outcome: Ongoing (interventions implemented as appropriate)    Goal: Discharge Needs Assessment  Outcome: Ongoing (interventions implemented as appropriate)    Goal: Interprofessional Rounds/Family Conf  Outcome: Ongoing (interventions implemented as appropriate)      Problem: Fall Risk (Adult)  Goal: Identify Related Risk Factors and Signs and Symptoms  Outcome: Ongoing (interventions implemented as appropriate)    Goal: Absence of Fall  Outcome: Ongoing (interventions implemented as appropriate)      Problem: Pain, Acute (Adult)  Goal: Identify Related Risk Factors and Signs and Symptoms  Outcome: Outcome(s) achieved Date Met: 08/13/18    Goal: Acceptable Pain Control/Comfort Level  Outcome: Ongoing (interventions implemented as appropriate)      Problem: Skin Injury Risk (Adult)  Goal: Identify Related Risk Factors and Signs and Symptoms  Outcome: Ongoing (interventions implemented as appropriate)    Goal: Skin Health and Integrity  Outcome: Ongoing (interventions implemented as appropriate)

## 2018-08-14 VITALS
WEIGHT: 212 LBS | OXYGEN SATURATION: 94 % | TEMPERATURE: 97.9 F | RESPIRATION RATE: 16 BRPM | HEART RATE: 84 BPM | SYSTOLIC BLOOD PRESSURE: 134 MMHG | DIASTOLIC BLOOD PRESSURE: 74 MMHG | BODY MASS INDEX: 30.35 KG/M2 | HEIGHT: 70 IN

## 2018-08-14 LAB
ANION GAP SERPL CALCULATED.3IONS-SCNC: 11.2 MMOL/L
BUN BLD-MCNC: 26 MG/DL (ref 8–23)
BUN/CREAT SERPL: 22.8 (ref 7–25)
CALCIUM SPEC-SCNC: 9.2 MG/DL (ref 8.6–10.5)
CHLORIDE SERPL-SCNC: 102 MMOL/L (ref 98–107)
CO2 SERPL-SCNC: 24.8 MMOL/L (ref 22–29)
CREAT BLD-MCNC: 1.14 MG/DL (ref 0.76–1.27)
CREAT BLDA-MCNC: 1.5 MG/DL (ref 0.6–1.3)
GFR SERPL CREATININE-BSD FRML MDRD: 63 ML/MIN/1.73
GLUCOSE BLD-MCNC: 136 MG/DL (ref 65–99)
GLUCOSE BLDC GLUCOMTR-MCNC: 122 MG/DL (ref 70–130)
GLUCOSE BLDC GLUCOMTR-MCNC: 141 MG/DL (ref 70–130)
POTASSIUM BLD-SCNC: 5.1 MMOL/L (ref 3.5–5.2)
SODIUM BLD-SCNC: 138 MMOL/L (ref 136–145)

## 2018-08-14 PROCEDURE — 63710000001 METHYLPREDNISOLONE 4 MG TABLET THERAPY PACK 21 EACH DISP PACK: Performed by: NURSE PRACTITIONER

## 2018-08-14 PROCEDURE — 80048 BASIC METABOLIC PNL TOTAL CA: CPT | Performed by: HOSPITALIST

## 2018-08-14 PROCEDURE — 82962 GLUCOSE BLOOD TEST: CPT

## 2018-08-14 PROCEDURE — G0378 HOSPITAL OBSERVATION PER HR: HCPCS

## 2018-08-14 PROCEDURE — 96361 HYDRATE IV INFUSION ADD-ON: CPT

## 2018-08-14 RX ORDER — METHYLPREDNISOLONE 4 MG/1
TABLET ORAL
Qty: 1 EACH | Refills: 0 | Status: SHIPPED | OUTPATIENT
Start: 2018-08-14

## 2018-08-14 RX ORDER — BACLOFEN 10 MG/1
10 TABLET ORAL EVERY 6 HOURS PRN
Qty: 10 TABLET | Refills: 0 | Status: SHIPPED | OUTPATIENT
Start: 2018-08-14 | End: 2018-08-19

## 2018-08-14 RX ORDER — OXYCODONE HYDROCHLORIDE AND ACETAMINOPHEN 5; 325 MG/1; MG/1
1 TABLET ORAL EVERY 4 HOURS PRN
Qty: 10 TABLET | Refills: 0 | Status: SHIPPED | OUTPATIENT
Start: 2018-08-14 | End: 2018-08-17

## 2018-08-14 RX ORDER — ASPIRIN 81 MG/1
81 TABLET, CHEWABLE ORAL DAILY
Qty: 30 TABLET | Refills: 0 | Status: SHIPPED | OUTPATIENT
Start: 2018-08-15 | End: 2018-09-14

## 2018-08-14 RX ORDER — BACLOFEN 10 MG/1
10 TABLET ORAL EVERY 6 HOURS PRN
Status: DISCONTINUED | OUTPATIENT
Start: 2018-08-14 | End: 2018-08-14 | Stop reason: HOSPADM

## 2018-08-14 RX ORDER — PANTOPRAZOLE SODIUM 40 MG/1
40 TABLET, DELAYED RELEASE ORAL
Qty: 30 TABLET | Refills: 0 | Status: SHIPPED | OUTPATIENT
Start: 2018-08-15 | End: 2018-09-14

## 2018-08-14 RX ADMIN — MULTIPLE VITAMINS W/ MINERALS TAB 1 TABLET: TAB at 09:37

## 2018-08-14 RX ADMIN — METHYLPREDNISOLONE 4 MG: 4 TABLET ORAL at 12:27

## 2018-08-14 RX ADMIN — METHYLPREDNISOLONE 4 MG: 4 TABLET ORAL at 07:51

## 2018-08-14 RX ADMIN — SODIUM CHLORIDE 75 ML/HR: 4.5 INJECTION, SOLUTION INTRAVENOUS at 06:15

## 2018-08-14 RX ADMIN — PANTOPRAZOLE SODIUM 40 MG: 40 TABLET, DELAYED RELEASE ORAL at 06:38

## 2018-08-14 RX ADMIN — ASPIRIN 81 MG: 81 TABLET, CHEWABLE ORAL at 09:37

## 2018-08-14 RX ADMIN — LOSARTAN POTASSIUM 50 MG: 50 TABLET, FILM COATED ORAL at 09:37

## 2018-08-14 RX ADMIN — VITAMIN D, TAB 1000IU (100/BT) 1000 UNITS: 25 TAB at 09:37

## 2018-08-14 NOTE — PLAN OF CARE
Problem: Patient Care Overview  Goal: Plan of Care Review  Outcome: Ongoing (interventions implemented as appropriate)   08/14/18 0349   Coping/Psychosocial   Plan of Care Reviewed With patient   Plan of Care Review   Progress improving   OTHER   Outcome Summary Pt in RA, VSS. IVF running, pt no c/o pain during shift. Will continue to monitor.      Goal: Individualization and Mutuality  Outcome: Ongoing (interventions implemented as appropriate)    Goal: Discharge Needs Assessment  Outcome: Ongoing (interventions implemented as appropriate)    Goal: Interprofessional Rounds/Family Conf  Outcome: Ongoing (interventions implemented as appropriate)      Problem: Fall Risk (Adult)  Goal: Identify Related Risk Factors and Signs and Symptoms  Outcome: Ongoing (interventions implemented as appropriate)    Goal: Absence of Fall  Outcome: Ongoing (interventions implemented as appropriate)      Problem: Pain, Acute (Adult)  Goal: Acceptable Pain Control/Comfort Level  Outcome: Ongoing (interventions implemented as appropriate)      Problem: Skin Injury Risk (Adult)  Goal: Identify Related Risk Factors and Signs and Symptoms  Outcome: Ongoing (interventions implemented as appropriate)    Goal: Skin Health and Integrity  Outcome: Ongoing (interventions implemented as appropriate)

## 2018-08-14 NOTE — SIGNIFICANT NOTE
08/14/18 1112   Rehab Time/Intention   Evaluation Not Performed (Pt up in room independently and denies a need for PT. States he has an outpt PT eval scheduled. PT will sign off. Notified Luz Marina DOWD.)   Rehab Treatment   Discipline physical therapist

## 2018-08-14 NOTE — DISCHARGE SUMMARY
Discharge summary    Date of admission 8/11/2018  Date of discharge 8/14/2018    Final diagnosis  Severe low back pain   resolved  Left hip pain resolved  Lumbar stenosis  Degenerative disc disease   Diabetes mellitus  Hypertension  Hyperlipidemia  Osteoarthritis  Gastroesophageal reflux disease    Discharge medications    Current Facility-Administered Medications:   •  aspirin chewable tablet 81 mg, 81 mg, Oral, Daily, Edson No MD, 81 mg at 08/14/18 0937  •  baclofen (LIORESAL) tablet 10 mg, 10 mg, Oral, Q6H PRN, Edson No MD  •  cholecalciferol (VITAMIN D3) tablet 1,000 Units, 1,000 Units, Oral, Daily, Edson No MD, 1,000 Units at 08/14/18 0937  •  losartan (COZAAR) tablet 50 mg, 50 mg, Oral, Daily, Edson No MD, 50 mg at 08/14/18 0937  •  MethylPREDNISolone (MEDROL (EVA)) tablet 4 mg, 4 mg, Oral, TID Around Food, Bird, Beth E., APRN, 4 mg at 08/14/18 0751  •  [START ON 8/15/2018] MethylPREDNISolone (MEDROL (EVA)) tablet 4 mg, 4 mg, Oral, 4x Daily Taper, Bird, Beth E., APRN  •  [START ON 8/16/2018] MethylPREDNISolone (MEDROL (EVA)) tablet 4 mg, 4 mg, Oral, TID Around Food, Bird, Beth E., APRN  •  [START ON 8/17/2018] MethylPREDNISolone (MEDROL (EVA)) tablet 4 mg, 4 mg, Oral, BID, Bird, Beth E., APRN  •  [START ON 8/18/2018] MethylPREDNISolone (MEDROL (EVA)) tablet 4 mg, 4 mg, Oral, Before Breakfast, Bird, Beth E., APRN  •  MethylPREDNISolone (MEDROL (EVA)) tablet 8 mg, 8 mg, Oral, Tonight, Bird, Beth E., APRN  •  multivitamin with minerals 1 tablet, 1 tablet, Oral, Daily, Edson No MD, 1 tablet at 08/14/18 0937  •  oxyCODONE-acetaminophen (PERCOCET) 5-325 MG per tablet 1 tablet, 1 tablet, Oral, Q4H PRN, 1 tablet at 08/12/18 6266 **OR** [DISCONTINUED] oxyCODONE-acetaminophen (PERCOCET) 5-325 MG per tablet 2 tablet, 2 tablet, Oral, Q4H PRN, Edson No MD  •  pantoprazole (PROTONIX) EC tablet 40 mg, 40 mg, Oral, QAM AC, Edson No MD, 40 mg at 08/14/18 0638  •  Insert peripheral IV, , ,  Once **AND** sodium chloride 0.9 % flush 10 mL, 10 mL, Intravenous, PRN, Ashish Quach MD     Consult obtained  Orthopedic surgery  Spine surgery    Procedure  None    Hospital course  76-year-old white male with history of diabetes hypertension hyperlipidemia osteoarthritis admitted through emergency room with severe low back pain that he is unable to walk.  Patient evaluated in ER and admitted for management.  Patient workup revealed severe degenerative disc disease and treated with IV steroids followed by oral steroids and his symptoms resolved.  Patient further evaluated by neurosurgery and the recommend no surgical intervention at this time just keep the steroids.  Patient able to walk now but he also has left hip pain which is further evaluated by orthopedic surgery and the recommend no further workup as symptoms completely resolved and he can be discharged.  Patient to discharge home on oral steroids.    Discharge diet regular    Activity as tolerated    Medication as above    Follow-up with primary doctor in 1 week and follow with orthopedic surgery and spine surgery per the instruction and take medication as directed    AZEEM BLANCA MD

## 2018-08-14 NOTE — CONSULTS
"   Orthopedic Consult      Patient: Capo Gonzales    Date of Admission: 8/11/2018  6:41 PM    YOB: 1945    Medical Record Number: [id number]    Consulting Physician: Edson No MD    Chief Complaints: Back and hip pain    History of Present Illness: 73 y.o. male admitted to [facility] to services of Edson No MD with severe back pain.  He tells me that the pain was such that he was unable to walk.  In fact, he states that the pain was \"so bad I couldn't even think straight\".  He was admitted here and placed on steroids.  His pain is now completely resolved.  He denies any pain in the back or hip whatsoever.    Allergies: No Known Allergies    Home Medications:    Current Facility-Administered Medications:   •  aspirin chewable tablet 81 mg, 81 mg, Oral, Daily, Edson No MD, 81 mg at 08/13/18 0854  •  cholecalciferol (VITAMIN D3) tablet 1,000 Units, 1,000 Units, Oral, Daily, Edson No MD, 1,000 Units at 08/13/18 0854  •  insulin aspart (novoLOG) injection 0-7 Units, 0-7 Units, Subcutaneous, 4x Daily With Meals & Nightly, Edson No MD  •  losartan (COZAAR) tablet 50 mg, 50 mg, Oral, Daily, Edson No MD, 50 mg at 08/13/18 0855  •  [START ON 8/14/2018] MethylPREDNISolone (MEDROL (EVA)) tablet 4 mg, 4 mg, Oral, TID Around Food, Bird, Beth E., APRN  •  [START ON 8/15/2018] MethylPREDNISolone (MEDROL (EVA)) tablet 4 mg, 4 mg, Oral, 4x Daily Taper, Bird, Beth E., APRN  •  [START ON 8/16/2018] MethylPREDNISolone (MEDROL (EVA)) tablet 4 mg, 4 mg, Oral, TID Around Food, Bird, Beth E., APRN  •  [START ON 8/17/2018] MethylPREDNISolone (MEDROL (EVA)) tablet 4 mg, 4 mg, Oral, BID, Bird, Beth E., APRN  •  [START ON 8/18/2018] MethylPREDNISolone (MEDROL (EVA)) tablet 4 mg, 4 mg, Oral, Before Breakfast, Beth Villanueva, APRN  •  MethylPREDNISolone (MEDROL (EVA)) tablet 8 mg, 8 mg, Oral, Tonight, Beth Villanueva, APRN  •  [START ON 8/14/2018] MethylPREDNISolone (MEDROL (EVA)) tablet 8 mg, 8 mg, " Oral, Tonight, Beth Villanueva, JESSE  •  multivitamin with minerals 1 tablet, 1 tablet, Oral, Daily, Edson No MD, 1 tablet at 08/13/18 0854  •  ondansetron (ZOFRAN) injection 4 mg, 4 mg, Intravenous, Q6H PRN, Edson No MD  •  oxyCODONE-acetaminophen (PERCOCET) 5-325 MG per tablet 1 tablet, 1 tablet, Oral, Q4H PRN, 1 tablet at 08/12/18 2249 **OR** oxyCODONE-acetaminophen (PERCOCET) 5-325 MG per tablet 2 tablet, 2 tablet, Oral, Q4H PRN, Edson No MD  •  pantoprazole (PROTONIX) EC tablet 40 mg, 40 mg, Oral, QAM AC, Edson No MD, 40 mg at 08/13/18 0650  •  sodium chloride 0.45 % infusion, 75 mL/hr, Intravenous, Continuous, Edson No MD, Last Rate: 75 mL/hr at 08/13/18 1851, 75 mL/hr at 08/13/18 1851  •  Insert peripheral IV, , , Once **AND** sodium chloride 0.9 % flush 10 mL, 10 mL, Intravenous, PRN, Ashish Quach MD    Current Medications:  Scheduled Meds:  aspirin 81 mg Oral Daily   cholecalciferol 1,000 Units Oral Daily   insulin aspart 0-7 Units Subcutaneous 4x Daily With Meals & Nightly   losartan 50 mg Oral Daily   [START ON 8/14/2018] MethylPREDNISolone 4 mg Oral TID Around Food   [START ON 8/15/2018] MethylPREDNISolone 4 mg Oral 4x Daily Taper   [START ON 8/16/2018] MethylPREDNISolone 4 mg Oral TID Around Food   [START ON 8/17/2018] MethylPREDNISolone 4 mg Oral BID   [START ON 8/18/2018] MethylPREDNISolone 4 mg Oral Before Breakfast   MethylPREDNISolone 8 mg Oral Tonight   [START ON 8/14/2018] MethylPREDNISolone 8 mg Oral Tonight   multivitamin with minerals 1 tablet Oral Daily   pantoprazole 40 mg Oral QAM AC     Continuous Infusions:  sodium chloride 75 mL/hr Last Rate: 75 mL/hr (08/13/18 1851)     PRN Meds:.ondansetron  •  oxyCODONE-acetaminophen **OR** oxyCODONE-acetaminophen  •  Insert peripheral IV **AND** sodium chloride    Past Medical History:   Diagnosis Date   • Arthritis    • Diabetes mellitus (CMS/HCC)    • Hyperlipidemia    • Hypertension        History reviewed. No  pertinent surgical history.    Social History     Occupational History   • Not on file.     Social History Main Topics   • Smoking status: Never Smoker   • Smokeless tobacco: Never Used   • Alcohol use No   • Drug use: Unknown   • Sexual activity: Defer    Social History     Social History Narrative   • No narrative on file     History reviewed. No pertinent family history.    Review of Systems:     Constitutional:  Denies fever, shaking or chills   Eyes:  Denies change in visual acuity   HEENT:  Denies nasal congestion or sore throat   Respiratory:  Denies cough or shortness of breath   Cardiovascular:  Denies chest pain or edema  Endocrine: Denies tremors, palpitations, intolerance of heat or cold, polyuria, polydipsia.  GI:  Denies abdominal pain, nausea, vomiting, bloody stools or diarrhea  :  Denies frequency, urgency, incontinence, retention, or nocturia.  Musculoskeletal:  Denies numbness tingling or loss of motor function   Integument:  Denies rash, lesion or ulceration   Neurologic:  Denies headache or focal weakness, deficits  Heme:  Denies epistaxis, spontaneous or excessive bleeding, hematuria, melena, fatigue, enlarged or tender lymph nodes.      All other pertinent positives and negatives as noted above in HPI.    Physical Exam: 73 y.o. male    Vitals:    08/13/18 0700 08/13/18 0855 08/13/18 1355 08/13/18 1925   BP: 106/57 126/62 121/71 127/76   BP Location: Left arm  Left arm Left arm   Patient Position: Lying  Sitting Lying   Pulse:  78 84 88   Resp: 18 17 18   Temp: 98.4 °F (36.9 °C)  98.1 °F (36.7 °C) 98.3 °F (36.8 °C)   TempSrc: Oral  Oral Oral   SpO2:   92% 92%   Weight:       Height:         General:  Awake, alert.  At the time of my evaluation he was sitting up on his bed with his legs crossed not in any evident discomfort or distress.    Head/Neck:  Normocephalic, atraumatic.  Conjunctiva and sclera clear.  Hearing adequate for the exam.  Neck is supple with normal ROM.    Psych:  Affect  and demeanor appropriate.    CV:  Regular rate and rhythm.  Hemodynamically stable.    Lungs:  Good chest expansion, breathing unlabored.    Abdomen:  Soft.  Non-tender, non-distended.    Extremities:  Left hip:  Skin is benign.  No gross abnormalities on inspection.  No palpable masses or adenopathy.  No significant tenderness.  Full symmetric motion.  No instability.  Good strength with hip flexion, hip abduction, knee extension, ankle and toe plantarflexion and dorsiflexion.  Sensation is intact distally.  Brisk capillary refill in the toes with good skin turgor.      I had him get up and walk for me.  He was able to walk all around the room without any pain whatsoever.    All other extremities atraumatic without gross abnormality.     Diagnostic Tests:    Admission on 08/11/2018   Component Date Value Ref Range Status   • Glucose 08/11/2018 97  70 - 130 mg/dL Final   • Glucose 08/12/2018 126  70 - 130 mg/dL Final   • Glucose 08/12/2018 130  70 - 130 mg/dL Final   • Glucose 08/13/2018 106* 65 - 99 mg/dL Final   • BUN 08/13/2018 29* 8 - 23 mg/dL Final   • Creatinine 08/13/2018 1.46* 0.76 - 1.27 mg/dL Final   • Sodium 08/13/2018 144  136 - 145 mmol/L Final   • Potassium 08/13/2018 4.4  3.5 - 5.2 mmol/L Final   • Chloride 08/13/2018 106  98 - 107 mmol/L Final   • CO2 08/13/2018 25.4  22.0 - 29.0 mmol/L Final   • Calcium 08/13/2018 9.5  8.6 - 10.5 mg/dL Final   • Total Protein 08/13/2018 6.4  6.0 - 8.5 g/dL Final   • Albumin 08/13/2018 3.90  3.50 - 5.20 g/dL Final   • ALT (SGPT) 08/13/2018 24  1 - 41 U/L Final   • AST (SGOT) 08/13/2018 21  1 - 40 U/L Final   • Alkaline Phosphatase 08/13/2018 33* 39 - 117 U/L Final   • Total Bilirubin 08/13/2018 0.3  0.1 - 1.2 mg/dL Final   • eGFR Non African Amer 08/13/2018 47* >60 mL/min/1.73 Final   • Globulin 08/13/2018 2.5  gm/dL Final   • A/G Ratio 08/13/2018 1.6  g/dL Final   • BUN/Creatinine Ratio 08/13/2018 19.9  7.0 - 25.0 Final   • Anion Gap 08/13/2018 12.6  mmol/L Final    • proBNP 08/13/2018 13.9  0.0 - 900.0 pg/mL Final   • TSH 08/13/2018 6.830* 0.270 - 4.200 mIU/mL Final   • Total Cholesterol 08/13/2018 124  0 - 200 mg/dL Final   • Triglycerides 08/13/2018 136  0 - 150 mg/dL Final   • HDL Cholesterol 08/13/2018 28* 40 - 60 mg/dL Final   • LDL Cholesterol  08/13/2018 69  0 - 100 mg/dL Final   • VLDL Cholesterol 08/13/2018 27.2  5 - 40 mg/dL Final   • LDL/HDL Ratio 08/13/2018 2.46   Final   • Hemoglobin A1C 08/13/2018 5.87* 4.80 - 5.60 % Final   • Sed Rate 08/13/2018 4  0 - 20 mm/hr Final   • C-Reactive Protein 08/13/2018 0.04  0.00 - 0.50 mg/dL Final   • WBC 08/13/2018 6.01  4.50 - 10.70 10*3/mm3 Final   • RBC 08/13/2018 4.30* 4.60 - 6.00 10*6/mm3 Final   • Hemoglobin 08/13/2018 14.1  13.7 - 17.6 g/dL Final   • Hematocrit 08/13/2018 42.4  40.4 - 52.2 % Final   • MCV 08/13/2018 98.6* 79.8 - 96.2 fL Final   • MCH 08/13/2018 32.8* 27.0 - 32.7 pg Final   • MCHC 08/13/2018 33.3  32.6 - 36.4 g/dL Final   • RDW 08/13/2018 13.8  11.5 - 14.5 % Final   • RDW-SD 08/13/2018 48.8  37.0 - 54.0 fl Final   • MPV 08/13/2018 10.3  6.0 - 12.0 fL Final   • Platelets 08/13/2018 192  140 - 500 10*3/mm3 Final   • Neutrophil % 08/13/2018 46.4  42.7 - 76.0 % Final   • Lymphocyte % 08/13/2018 37.1  19.6 - 45.3 % Final   • Monocyte % 08/13/2018 13.5* 5.0 - 12.0 % Final   • Eosinophil % 08/13/2018 2.8  0.3 - 6.2 % Final   • Basophil % 08/13/2018 0.2  0.0 - 1.5 % Final   • Immature Grans % 08/13/2018 0.2  0.0 - 0.5 % Final   • Neutrophils, Absolute 08/13/2018 2.79  1.90 - 8.10 10*3/mm3 Final   • Lymphocytes, Absolute 08/13/2018 2.23  0.90 - 4.80 10*3/mm3 Final   • Monocytes, Absolute 08/13/2018 0.81  0.20 - 1.20 10*3/mm3 Final   • Eosinophils, Absolute 08/13/2018 0.17  0.00 - 0.70 10*3/mm3 Final   • Basophils, Absolute 08/13/2018 0.01  0.00 - 0.20 10*3/mm3 Final   • Immature Grans, Absolute 08/13/2018 0.01  0.00 - 0.03 10*3/mm3 Final   • Glucose 08/13/2018 96  70 - 130 mg/dL Final   • Glucose 08/13/2018 98   70 - 130 mg/dL Final   • T3, Free 08/13/2018 2.98  2.00 - 4.40 pg/mL Final   • Free T4 08/13/2018 1.22  0.93 - 1.70 ng/dL Final   • Glucose 08/13/2018 131* 70 - 130 mg/dL Final   • Glucose 08/13/2018 177* 70 - 130 mg/dL Final     Lab Results (last 24 hours)     Procedure Component Value Units Date/Time    POC Glucose Once [[id number]]  (Abnormal) Collected:  08/13/18 1931    Specimen:  Blood Updated:  08/13/18 1942     Glucose 177 (H) mg/dL     Narrative:       Meter: [id number] : [id number] Rubi GONZALEZ    POC Glucose Once [[id number]]  (Abnormal) Collected:  08/13/18 1741    Specimen:  Blood Updated:  08/13/18 1743     Glucose 131 (H) mg/dL     Narrative:       Meter: [id number] : [id number] Hema GONZALEZ    T3, Free [[id number]]  (Normal) Collected:  08/13/18 1518    Specimen:  Blood Updated:  08/13/18 1701     T3, Free 2.98 pg/mL     T4, Free [[id number]]  (Normal) Collected:  08/13/18 1518    Specimen:  Blood Updated:  08/13/18 1701     Free T4 1.22 ng/dL     POC Glucose Once [[id number]]  (Normal) Collected:  08/13/18 1103    Specimen:  Blood Updated:  08/13/18 1105     Glucose 98 mg/dL     Narrative:       Meter: [id number] : [id number] Daniel GONZALEZ    POC Glucose Once [[id number]]  (Normal) Collected:  08/13/18 0722    Specimen:  Blood Updated:  08/13/18 0724     Glucose 96 mg/dL     Narrative:       Meter: [id number] : [id number] Daniel GONZALEZ    Sedimentation Rate [[id number]]  (Normal) Collected:  08/13/18 0543    Specimen:  Blood Updated:  08/13/18 0722     Sed Rate 4 mm/hr     BNP [[id number]]  (Normal) Collected:  08/13/18 0543    Specimen:  Blood Updated:  08/13/18 0710     proBNP 13.9 pg/mL     Narrative:       Among patients with dyspnea, NT-proBNP is highly sensitive for the detection of acute congestive heart failure. In addition NT-proBNP of <300 pg/ml effectively rules out acute congestive heart failure with 99% negative predictive value.    TSH [[id number]]  (Abnormal)  Collected:  08/13/18 0543    Specimen:  Blood Updated:  08/13/18 0710     TSH 6.830 (H) mIU/mL     Comprehensive Metabolic Panel [[id number]]  (Abnormal) Collected:  08/13/18 0543    Specimen:  Blood Updated:  08/13/18 0705     Glucose 106 (H) mg/dL      BUN 29 (H) mg/dL      Creatinine 1.46 (H) mg/dL      Sodium 144 mmol/L      Potassium 4.4 mmol/L      Chloride 106 mmol/L      CO2 25.4 mmol/L      Calcium 9.5 mg/dL      Total Protein 6.4 g/dL      Albumin 3.90 g/dL      ALT (SGPT) 24 U/L      AST (SGOT) 21 U/L      Alkaline Phosphatase 33 (L) U/L      Total Bilirubin 0.3 mg/dL      eGFR Non African Amer 47 (L) mL/min/1.73      Globulin 2.5 gm/dL      A/G Ratio 1.6 g/dL      BUN/Creatinine Ratio 19.9     Anion Gap 12.6 mmol/L     Narrative:       The MDRD GFR formula is only valid for adults with stable renal function between ages 18 and 70.    Lipid Panel [[id number]]  (Abnormal) Collected:  08/13/18 0543    Specimen:  Blood Updated:  08/13/18 0705     Total Cholesterol 124 mg/dL      Triglycerides 136 mg/dL      HDL Cholesterol 28 (L) mg/dL      LDL Cholesterol  69 mg/dL      VLDL Cholesterol 27.2 mg/dL      LDL/HDL Ratio 2.46    Narrative:       Cholesterol Reference Ranges  (U.S. Department of Health and Human Services ATP III Classifications)    Desirable          <200 mg/dL  Borderline High    200-239 mg/dL  High Risk          >240 mg/dL      Triglyceride Reference Ranges  (U.S. Department of Health and Human Services ATP III Classifications)    Normal           <150 mg/dL  Borderline High  150-199 mg/dL  High             200-499 mg/dL  Very High        >500 mg/dL    HDL Reference Ranges  (U.S. Department of Health and Human Services ATP III Classifcations)    Low     <40 mg/dl (major risk factor for CHD)  High    >60 mg/dl ('negative' risk factor for CHD)        LDL Reference Ranges  (U.S. Department of Health and Human Services ATP III Classifcations)    Optimal          <100 mg/dL  Near Optimal     100-129  mg/dL  Borderline High  130-159 mg/dL  High             160-189 mg/dL  Very High        >189 mg/dL    C-reactive Protein [[id number]]  (Normal) Collected:  08/13/18 0543    Specimen:  Blood Updated:  08/13/18 0705     C-Reactive Protein 0.04 mg/dL     Hemoglobin A1c [[id number]]  (Abnormal) Collected:  08/13/18 0543    Specimen:  Blood Updated:  08/13/18 0634     Hemoglobin A1C 5.87 (H) %     Narrative:       Hemoglobin A1C Ranges:    Increased Risk for Diabetes  5.7% to 6.4%  Diabetes                     >= 6.5%  Diabetic Goal                < 7.0%    CBC & Differential [[id number]] Collected:  08/13/18 0543    Specimen:  Blood Updated:  08/13/18 0632    Narrative:       The following orders were created for panel order CBC & Differential.  Procedure                               Abnormality         Status                     ---------                               -----------         ------                     CBC Auto Differential[[id number]]        Abnormal            Final result                 Please view results for these tests on the individual orders.    CBC Auto Differential [[id number]]  (Abnormal) Collected:  08/13/18 0543    Specimen:  Blood Updated:  08/13/18 0632     WBC 6.01 10*3/mm3      RBC 4.30 (L) 10*6/mm3      Hemoglobin 14.1 g/dL      Hematocrit 42.4 %      MCV 98.6 (H) fL      MCH 32.8 (H) pg      MCHC 33.3 g/dL      RDW 13.8 %      RDW-SD 48.8 fl      MPV 10.3 fL      Platelets 192 10*3/mm3      Neutrophil % 46.4 %      Lymphocyte % 37.1 %      Monocyte % 13.5 (H) %      Eosinophil % 2.8 %      Basophil % 0.2 %      Immature Grans % 0.2 %      Neutrophils, Absolute 2.79 10*3/mm3      Lymphocytes, Absolute 2.23 10*3/mm3      Monocytes, Absolute 0.81 10*3/mm3      Eosinophils, Absolute 0.17 10*3/mm3      Basophils, Absolute 0.01 10*3/mm3      Immature Grans, Absolute 0.01 10*3/mm3     POC Glucose Once [[id number]]  (Normal) Collected:  08/12/18 2048    Specimen:  Blood Updated:  08/12/18 2109      Glucose 130 mg/dL     Narrative:       Meter: [id number] : [id number] Delfino Crandall PRETTY        Imaging: None taken    Assessment: Back and left hip pain, resolved    Plan:  His exam is completely benign.  His symptoms are resolved.  I will sign off.  He can follow-up as needed.    Date: 8/13/2018    Delbert Solano MD    CC: Edson No MD

## 2018-08-14 NOTE — PROGRESS NOTES
Case Management Discharge Note    Final Note: Patient has been DC'd home with wife.         Other: Other (Private car)    Final Discharge Disposition Code: 01 - home or self-care

## 2018-08-14 NOTE — PROGRESS NOTES
"Daily progress note    Chief complaint  Doing better  No new complaints    History of present illness  73-year-old white male with history of diabetes mellitus hypertension hyperlipidemia osteoarthritis presented to [facility] with severe low back pain started a month ago in which is getting worse not radiating to the left knee.  Patient unable to walk yesterday due to pain.  Patient denies numbness tingling or weakness.  Patient has no loss of control of bowel or bladder.  Patient evaluated in ER with a CT lumbar spine which showed degenerative disc disease but he can't even move admitted for further workup.  Patient denies any fall, injury.  Patient also denies any fever or chills chest pain shortness of breath abdominal pain nausea vomiting diarrhea.     REVIEW OF SYSTEMS  Review of Systems   Constitutional: Negative for activity change, appetite change and fever.   HENT: Negative for congestion and sore throat.    Eyes: Negative.    Respiratory: Negative for cough and shortness of breath.    Cardiovascular: Negative for chest pain and leg swelling.   Gastrointestinal: Negative for abdominal pain, diarrhea and vomiting.   Endocrine: Negative.    Genitourinary: Negative for decreased urine volume and dysuria.   Musculoskeletal: Positive for back pain (Lower ). Negative for neck pain.   Skin: Negative for rash and wound.   Allergic/Immunologic: Negative.    Neurological: Negative for weakness, numbness and headaches.   Hematological: Negative.    Psychiatric/Behavioral: Negative.    All other systems reviewed and are negative.     PHYSICAL EXAM  Blood pressure 134/74, pulse 84, temperature 97.9 °F (36.6 °C), temperature source Oral, resp. rate 16, height 177.8 cm (70\"), weight 96.2 kg (212 lb), SpO2 94 %.    Constitutional: No distress.   Head: Normocephalic and atraumatic.   Eyes: EOM are normal.   Neck: Normal range of motion.   Pulmonary/Chest: No respiratory distress.   Abdominal: There is no " tenderness.   Musculoskeletal: He exhibits tenderness. He exhibits no edema.   Lower L spine and SI joint tenderness  Normal strength and sensation in BLE, but difficulty lifting L leg due to pain  No saddle anesthesia   Neurological: He is alert.   Skin: Skin is warm and dry.      LABS  Lab Results (last 24 hours)     Procedure Component Value Units Date/Time    POC Glucose Once [[id number]]  (Abnormal) Collected:  08/14/18 1110    Specimen:  Blood Updated:  08/14/18 1111     Glucose 141 (H) mg/dL     Narrative:       Meter: [id number] : [id number] Ashley GONZALEZ    POC Glucose Once [[id number]]  (Normal) Collected:  08/14/18 0715    Specimen:  Blood Updated:  08/14/18 0718     Glucose 122 mg/dL     Narrative:       Meter: [id number] : [id number] Ashley GONZALEZ    Basic Metabolic Panel [[id number]]  (Abnormal) Collected:  08/14/18 0533    Specimen:  Blood Updated:  08/14/18 0648     Glucose 136 (H) mg/dL      BUN 26 (H) mg/dL      Creatinine 1.14 mg/dL      Sodium 138 mmol/L      Potassium 5.1 mmol/L      Chloride 102 mmol/L      CO2 24.8 mmol/L      Calcium 9.2 mg/dL      eGFR Non African Amer 63 mL/min/1.73      BUN/Creatinine Ratio 22.8     Anion Gap 11.2 mmol/L     Narrative:       The MDRD GFR formula is only valid for adults with stable renal function between ages 18 and 70.    POC Creatinine [[id number]]  (Abnormal) Collected:  08/12/18 1217    Specimen:  Blood Updated:  08/14/18 0616     Creatinine 1.50 (H) mg/dL      Comment: Serial Number: [id number]:  [id number]       POC Glucose Once [[id number]]  (Abnormal) Collected:  08/13/18 1931    Specimen:  Blood Updated:  08/13/18 1942     Glucose 177 (H) mg/dL     Narrative:       Meter: [id number] : [id number] Rubi GONZALEZ    POC Glucose Once [[id number]]  (Abnormal) Collected:  08/13/18 1741    Specimen:  Blood Updated:  08/13/18 1743     Glucose 131 (H) mg/dL     Narrative:       Meter: [id number] : [id number] Hema GONZALEZ    T3,  Free [[id number]]  (Normal) Collected:  08/13/18 1518    Specimen:  Blood Updated:  08/13/18 1701     T3, Free 2.98 pg/mL     T4, Free [[id number]]  (Normal) Collected:  08/13/18 1518    Specimen:  Blood Updated:  08/13/18 1701     Free T4 1.22 ng/dL         Imaging Results (last 72 hours)     Procedure Component Value Units Date/Time    MRI Lumbar Spine With & Without Contrast [[id number]] Collected:  08/12/18 1334     Updated:  08/12/18 1359    Narrative:       MR SCAN OF THE LUMBAR SPINE WITHOUT AND WITH INTRAVENOUS CONTRAST     HISTORY: Low back pain extending into left leg. Sudden onset after  bending over.     FINDINGS:  The MR scan was performed with sagittal and axial images and  includes T1 images without and with intravenous contrast. The following  findings are present:  1. There are numerous cysts within the partially visualized kidneys,  particularly on the right. The appearance is concerning for polycystic  renal disease and further clinical correlation is recommended.  2. The conus appears normal. There is no significant disc or bony  abnormality at T11-12 or T12-L1. At L1-2, there is no disc abnormality.  There is slight facet hypertrophy without central or foraminal  encroachment.  3. At L2-3, there is no disc abnormality. There is moderate facet  hypertrophy without central or foraminal encroachment.  4. At L3-4, there is a combination of very severe facet spurring and  slight anterior subluxation of L3 measuring 4 mm with unroofing of the  disc and generalized disc bulge. This combination produces moderate  central stenosis. There is no foraminal encroachment.  5. At L4-5, there is mild to moderate broad-based disc bulge combined  with considerable facet spurring. There is no central stenosis, but the  degree of facet spurring causes some slight bilateral foraminal  encroachment.  6. At L5-S1, there is a slight central disc bulge and there is slight  facet hypertrophy and spurring. There is no  central or foraminal  encroachment.     This report was finalized on 8/12/2018 1:56 PM by Dr. Brian Obregon M.D.       CT Lumbar Spine Without Contrast [[id number]] Collected:  08/11/18 2034     Updated:  08/11/18 2041    Narrative:       NONCONTRAST CT SCAN LUMBAR SPINE     CLINICAL HISTORY: lbp (no reported trauma history)     COMPARISON: None.     TECHNIQUE: Radiation dose reduction techniques were utilized, including  automated exposure control and exposure modulation based on body size.  Axial noncontrast images of the lumbar spine were obtained without  contrast. Sagittal reformatted images were supplemented.     FINDINGS:  No acute vertebral fracture identified on the axial series.  There is minimal S-shaped thoracolumbar scoliosis on the coronal  reformats.     There is 2 mm of anterolisthesis at L3-4. The remaining lumbar vertebrae  are well aligned.  No significant compression deformity or retropulsion.     Moderately advanced multilevel spurring is present, more pronounced at  L2-3, L3-4, and L5-S1. There is moderate disc height loss at L5-S1. Mild  disc height loss at L4-5 with vacuum disc phenomenon. Multilevel facet  arthropathy is present, greatest at L3-4 and L4-5. Mild broad-based  protrusions are present throughout. Mild multilevel canal narrowing is  present. Foraminal stenosis is particularly pronounced at L4-5 and L5-S1  bilaterally.     There are nonobstructing right renal calculi incidentally noted..                      Impression:       1. Multilevel degenerative and arthritic changes as discussed, no acute  lumbar spine fracture.  2. Right nephrolithiasis incidentally noted     This report was finalized on 8/11/2018 8:38 PM by Torrey Atkinson M.D.             Current Facility-Administered Medications:   •  aspirin chewable tablet 81 mg, 81 mg, Oral, Daily, Edson No MD, 81 mg at 08/14/18 0937  •  cholecalciferol (VITAMIN D3) tablet 1,000 Units, 1,000 Units, Oral, Daily, Edson No  MD, 1,000 Units at 08/14/18 0937  •  insulin aspart (novoLOG) injection 0-7 Units, 0-7 Units, Subcutaneous, 4x Daily With Meals & Nightly, Edson No MD, 2 Units at 08/13/18 2054  •  losartan (COZAAR) tablet 50 mg, 50 mg, Oral, Daily, Edson No MD, 50 mg at 08/14/18 0937  •  MethylPREDNISolone (MEDROL (EVA)) tablet 4 mg, 4 mg, Oral, TID Around Food, Bird, Beth E., APRN, 4 mg at 08/14/18 0751  •  [START ON 8/15/2018] MethylPREDNISolone (MEDROL (EVA)) tablet 4 mg, 4 mg, Oral, 4x Daily Taper, Bird, Beth E., APRN  •  [START ON 8/16/2018] MethylPREDNISolone (MEDROL (EVA)) tablet 4 mg, 4 mg, Oral, TID Around Food, Bird, Beth E., APRN  •  [START ON 8/17/2018] MethylPREDNISolone (MEDROL (EVA)) tablet 4 mg, 4 mg, Oral, BID, Bird, Beth E., APRN  •  [START ON 8/18/2018] MethylPREDNISolone (MEDROL (EVA)) tablet 4 mg, 4 mg, Oral, Before Breakfast, Bird, Beth E., APRN  •  MethylPREDNISolone (MEDROL (EVA)) tablet 8 mg, 8 mg, Oral, Tonight, Bird, Beth E., APRN  •  multivitamin with minerals 1 tablet, 1 tablet, Oral, Daily, Edson No MD, 1 tablet at 08/14/18 0937  •  ondansetron (ZOFRAN) injection 4 mg, 4 mg, Intravenous, Q6H PRN, Edson No MD  •  oxyCODONE-acetaminophen (PERCOCET) 5-325 MG per tablet 1 tablet, 1 tablet, Oral, Q4H PRN, 1 tablet at 08/12/18 0350 **OR** oxyCODONE-acetaminophen (PERCOCET) 5-325 MG per tablet 2 tablet, 2 tablet, Oral, Q4H PRN, Edson No MD  •  pantoprazole (PROTONIX) EC tablet 40 mg, 40 mg, Oral, QAM AC, Edson No MD, 40 mg at 08/14/18 0638  •  sodium chloride 0.45 % infusion, 75 mL/hr, Intravenous, Continuous, Edson No MD, Last Rate: 75 mL/hr at 08/14/18 1019, 75 mL/hr at 08/14/18 1019  •  Insert peripheral IV, , , Once **AND** sodium chloride 0.9 % flush 10 mL, 10 mL, Intravenous, PRN, Ashish Quach MD     ASSESSMENT  Severe low back pain    Lumbar stenosis  Degenerative disc disease   Diabetes mellitus  Hypertension  Hyperlipidemia  Osteoarthritis  Gastroesophageal  reflux disease    PLAN  Discharge home on Medrol Dosepak  Discharge summary dictated    AZEEM BLANCA MD

## 2021-05-18 NOTE — ED PROVIDER NOTES
" EMERGENCY DEPARTMENT ENCOUNTER    CHIEF COMPLAINT  Chief Complaint: Lower back pain  History given by: Pt  History limited by: Nothing  Room Number: 601/1  PMD: Andreas Ford MD      HPI:  Pt is a 73 y.o. male who presents complaining of lower back pain that started 1 month ago and worsened yesterday. Today at 1300 the pt bent over and the pt could not ambulate after due to back pain. The pt denies numbness, tingling, or incontinence. The pt saw his PCP 5 days ago and was advised to go to PT, which the pt has not done yet.   The pt had 3 XRs at his PCP's office which showed worsening arthritis.     Duration:  2 days  Onset: Gradual  Timing: Constant  Location: Lower back  Radiation: None  Quality: \"pain\"  Intensity/Severity: Moderate  Progression: Worsening  Associated Symptoms: None  Aggravating Factors: Ambulation  Alleviating Factors: Unknown  Previous Episodes: Unknown  Treatment before arrival: Unknown    PAST MEDICAL HISTORY  Active Ambulatory Problems     Diagnosis Date Noted   • No Active Ambulatory Problems     Resolved Ambulatory Problems     Diagnosis Date Noted   • No Resolved Ambulatory Problems     Past Medical History:   Diagnosis Date   • Arthritis    • Diabetes mellitus (CMS/[facility])    • Hyperlipidemia        PAST SURGICAL HISTORY  History reviewed. No pertinent surgical history.    FAMILY HISTORY  No family history on file.    SOCIAL HISTORY  Social History     Social History   • Marital status:      Spouse name: N/A   • Number of children: N/A   • Years of education: N/A     Occupational History   • Not on file.     Social History Main Topics   • Smoking status: Not on file   • Smokeless tobacco: Not on file   • Alcohol use Not on file   • Drug use: Unknown   • Sexual activity: Not on file     Other Topics Concern   • Not on file     Social History Narrative   • No narrative on file       ALLERGIES  Patient has no known allergies.    REVIEW OF SYSTEMS  Review of Systems " Refill x 1 given. She made an  Appointment      Constitutional: Negative for activity change, appetite change and fever.   HENT: Negative for congestion and sore throat.    Eyes: Negative.    Respiratory: Negative for cough and shortness of breath.    Cardiovascular: Negative for chest pain and leg swelling.   Gastrointestinal: Negative for abdominal pain, diarrhea and vomiting.   Endocrine: Negative.    Genitourinary: Negative for decreased urine volume and dysuria.   Musculoskeletal: Positive for back pain (Lower ). Negative for neck pain.   Skin: Negative for rash and wound.   Allergic/Immunologic: Negative.    Neurological: Negative for weakness, numbness and headaches.   Hematological: Negative.    Psychiatric/Behavioral: Negative.    All other systems reviewed and are negative.      PHYSICAL EXAM  ED Triage Vitals   Temp Heart Rate Resp BP SpO2   08/11/18 1902 08/11/18 1836 08/11/18 1836 08/11/18 1836 08/11/18 1836   98 °F (36.7 °C) 97 18 126/68 94 %      Temp src Heart Rate Source Patient Position BP Location FiO2 (%)   -- 08/11/18 1836 -- -- --    Monitor          Physical Exam   Constitutional: No distress.   HENT:   Head: Normocephalic and atraumatic.   Eyes: EOM are normal.   Neck: Normal range of motion.   Pulmonary/Chest: No respiratory distress.   Abdominal: There is no tenderness.   Musculoskeletal: He exhibits tenderness. He exhibits no edema.   Lower L spine and SI joint tenderness  Normal strength and sensation in BLE, but difficulty lifting L leg due to pain  No saddle anesthesia   Neurological: He is alert.   Skin: Skin is warm and dry.   Nursing note and vitals reviewed.        RADIOLOGY  CT Lumbar Spine Without Contrast   Final Result   1. Multilevel degenerative and arthritic changes as discussed, no acute   lumbar spine fracture.   2. Right nephrolithiasis incidentally noted       This report was finalized on 8/11/2018 8:38 PM by Torrey Atkinson M.D.               I ordered the above noted radiological studies. Interpreted by radiologist.  Reviewed by me in PACS.       PROCEDURES  Procedures      PROGRESS AND CONSULTS     1932 Ordered CT L spine for further evaluation. Ordered Zofran and dilaudid for nausea and pain.     2055 Rechecked the patient and he is still in pain, and it hurts to turn in the bed. The pt cannot stand. Discussed pertinent imaging results, including CT L spine. Discussed the plan to admit the pt for pain control. Patient agrees with plan and all questions were addressed.      2058 Placed call to [facility] for admission. Ordered toradol for pain.     2100 Discussed the pt with Dr. No ([facility]) who agrees to admit the pt.     2102 Ordered initiate observation status and med surg bed.     MEDICAL DECISION MAKING  Results were reviewed/discussed with the patient and they were also made aware of online access. Pt also made aware that some labs, such as cultures, will not be resulted during ER visit and follow up with PMD is necessary.     MDM  Number of Diagnoses or Management Options  Acute midline low back pain without sciatica:   Intractable low back pain:   Diagnosis management comments: Patient did not have any focal weakness, saddle anesthesia, or bowel/bladder dysfunction.  CT scan showed evidence of multilevel degenerative changes, canal stenosis, and bulging disc.  Patient was given IV Dilaudid.  He was unable to ambulate due to pain.  Case was discussed with Dr. No and he agreed to admit the patient.       Amount and/or Complexity of Data Reviewed  Tests in the radiology section of CPT®: ordered and reviewed (CT L spine: degenerative changes, R nephrolithiasis)  Decide to obtain previous medical records or to obtain history from someone other than the patient: yes  Review and summarize past medical records: yes  Discuss the patient with other providers: yes (Dr. No ([facility]))    Patient Progress  Patient progress: stable         DIAGNOSIS  Final diagnoses:   Acute midline low back pain without sciatica   Intractable low  back pain       DISPOSITION  ADMISSION    Discussed treatment plan and reason for admission with pt/family and admitting physician.  Pt/family voiced understanding of the plan for admission for further testing/treatment as needed.     Latest Documented Vital Signs:  As of 9:42 PM  BP- 130/70 HR- 88 Temp- 98 °F (36.7 °C) O2 sat- 95%    --  Documentation assistance provided by rosa Montilla for Dr. Quach.  Information recorded by the scribe was done at my direction and has been verified and validated by me.     Cee Montilla  08/11/18 1935       Cee Montilla  08/11/18 2103       Ashish Quach MD  08/11/18 0418

## 2023-08-09 ENCOUNTER — OFFICE (AMBULATORY)
Dept: URBAN - METROPOLITAN AREA PATHOLOGY 4 | Facility: PATHOLOGY | Age: 78
End: 2023-08-09

## 2023-08-09 ENCOUNTER — AMBULATORY SURGICAL CENTER (AMBULATORY)
Dept: URBAN - METROPOLITAN AREA SURGERY 17 | Facility: SURGERY | Age: 78
End: 2023-08-09

## 2023-08-09 VITALS
HEART RATE: 91 BPM | RESPIRATION RATE: 22 BRPM | DIASTOLIC BLOOD PRESSURE: 43 MMHG | RESPIRATION RATE: 14 BRPM | SYSTOLIC BLOOD PRESSURE: 101 MMHG | DIASTOLIC BLOOD PRESSURE: 44 MMHG | RESPIRATION RATE: 25 BRPM | DIASTOLIC BLOOD PRESSURE: 46 MMHG | SYSTOLIC BLOOD PRESSURE: 86 MMHG | SYSTOLIC BLOOD PRESSURE: 95 MMHG | HEART RATE: 110 BPM | HEART RATE: 85 BPM | HEART RATE: 81 BPM | RESPIRATION RATE: 15 BRPM | DIASTOLIC BLOOD PRESSURE: 51 MMHG | SYSTOLIC BLOOD PRESSURE: 99 MMHG | SYSTOLIC BLOOD PRESSURE: 88 MMHG | TEMPERATURE: 97.8 F | HEART RATE: 89 BPM | SYSTOLIC BLOOD PRESSURE: 139 MMHG | OXYGEN SATURATION: 98 % | SYSTOLIC BLOOD PRESSURE: 152 MMHG | RESPIRATION RATE: 20 BRPM | DIASTOLIC BLOOD PRESSURE: 68 MMHG | HEART RATE: 87 BPM | WEIGHT: 190 LBS | HEIGHT: 70 IN | HEART RATE: 86 BPM | DIASTOLIC BLOOD PRESSURE: 80 MMHG | OXYGEN SATURATION: 97 % | RESPIRATION RATE: 6 BRPM | HEART RATE: 100 BPM | SYSTOLIC BLOOD PRESSURE: 79 MMHG | TEMPERATURE: 97.6 F | DIASTOLIC BLOOD PRESSURE: 55 MMHG | DIASTOLIC BLOOD PRESSURE: 49 MMHG | OXYGEN SATURATION: 95 % | SYSTOLIC BLOOD PRESSURE: 123 MMHG | HEART RATE: 90 BPM | DIASTOLIC BLOOD PRESSURE: 37 MMHG

## 2023-08-09 DIAGNOSIS — D12.3 BENIGN NEOPLASM OF TRANSVERSE COLON: ICD-10-CM

## 2023-08-09 DIAGNOSIS — Z85.038 PERSONAL HISTORY OF OTHER MALIGNANT NEOPLASM OF LARGE INTEST: ICD-10-CM

## 2023-08-09 DIAGNOSIS — Z86.010 PERSONAL HISTORY OF COLONIC POLYPS: ICD-10-CM

## 2023-08-09 DIAGNOSIS — D12.4 BENIGN NEOPLASM OF DESCENDING COLON: ICD-10-CM

## 2023-08-09 DIAGNOSIS — K64.0 FIRST DEGREE HEMORRHOIDS: ICD-10-CM

## 2023-08-09 DIAGNOSIS — K57.30 DIVERTICULOSIS OF LARGE INTESTINE WITHOUT PERFORATION OR ABS: ICD-10-CM

## 2023-08-09 PROBLEM — K63.5 POLYP OF COLON: Status: ACTIVE | Noted: 2023-08-09

## 2023-08-09 LAB
GI HISTOLOGY: A. UNSPECIFIED: (no result)
GI HISTOLOGY: B. UNSPECIFIED: (no result)
GI HISTOLOGY: C. UNSPECIFIED: (no result)
GI HISTOLOGY: D. UNSPECIFIED: (no result)
GI HISTOLOGY: PDF REPORT: (no result)

## 2023-08-09 PROCEDURE — 45385 COLONOSCOPY W/LESION REMOVAL: CPT | Mod: PT | Performed by: INTERNAL MEDICINE

## 2023-08-09 PROCEDURE — 88305 TISSUE EXAM BY PATHOLOGIST: CPT | Performed by: INTERNAL MEDICINE

## 2023-09-29 ENCOUNTER — APPOINTMENT (OUTPATIENT)
Dept: CARDIOLOGY | Facility: HOSPITAL | Age: 78
End: 2023-09-29
Payer: MEDICARE

## 2023-09-29 ENCOUNTER — HOSPITAL ENCOUNTER (EMERGENCY)
Facility: HOSPITAL | Age: 78
Discharge: HOME | End: 2023-09-29
Attending: EMERGENCY MEDICINE
Payer: MEDICARE

## 2023-09-29 VITALS
OXYGEN SATURATION: 96 % | RESPIRATION RATE: 18 BRPM | WEIGHT: 195 LBS | BODY MASS INDEX: 27.92 KG/M2 | DIASTOLIC BLOOD PRESSURE: 75 MMHG | TEMPERATURE: 97.4 F | HEART RATE: 80 BPM | HEIGHT: 70 IN | SYSTOLIC BLOOD PRESSURE: 136 MMHG

## 2023-09-29 DIAGNOSIS — I49.3 PVC'S (PREMATURE VENTRICULAR CONTRACTIONS): ICD-10-CM

## 2023-09-29 DIAGNOSIS — R42 POSTURAL DIZZINESS: Primary | ICD-10-CM

## 2023-09-29 LAB
ALBUMIN SERPL-MCNC: 4.5 G/DL (ref 3.5–5.2)
ALBUMIN/GLOB SERPL: 2 G/DL
ALP SERPL-CCNC: 38 U/L (ref 39–117)
ALT SERPL W P-5'-P-CCNC: 24 U/L (ref 1–41)
ANION GAP SERPL CALCULATED.3IONS-SCNC: 9.9 MMOL/L (ref 5–15)
AST SERPL-CCNC: 23 U/L (ref 1–40)
BASOPHILS # BLD AUTO: 0.02 10*3/MM3 (ref 0–0.2)
BASOPHILS NFR BLD AUTO: 0.4 % (ref 0–1.5)
BILIRUB SERPL-MCNC: 0.5 MG/DL (ref 0–1.2)
BUN SERPL-MCNC: 16 MG/DL (ref 8–23)
BUN/CREAT SERPL: 14.7 (ref 7–25)
CALCIUM SPEC-SCNC: 9.8 MG/DL (ref 8.6–10.5)
CHLORIDE SERPL-SCNC: 103 MMOL/L (ref 98–107)
CO2 SERPL-SCNC: 27.1 MMOL/L (ref 22–29)
CREAT SERPL-MCNC: 1.09 MG/DL (ref 0.76–1.27)
DEPRECATED RDW RBC AUTO: 46.6 FL (ref 37–54)
EGFRCR SERPLBLD CKD-EPI 2021: 69.5 ML/MIN/1.73
EOSINOPHIL # BLD AUTO: 0.08 10*3/MM3 (ref 0–0.4)
EOSINOPHIL NFR BLD AUTO: 1.5 % (ref 0.3–6.2)
ERYTHROCYTE [DISTWIDTH] IN BLOOD BY AUTOMATED COUNT: 13 % (ref 12.3–15.4)
GEN 5 2HR TROPONIN T REFLEX: 14 NG/L
GLOBULIN UR ELPH-MCNC: 2.3 GM/DL
GLUCOSE SERPL-MCNC: 108 MG/DL (ref 65–99)
HCT VFR BLD AUTO: 44.2 % (ref 37.5–51)
HGB BLD-MCNC: 14.9 G/DL (ref 13–17.7)
HOLD SPECIMEN: NORMAL
HOLD SPECIMEN: NORMAL
IMM GRANULOCYTES # BLD AUTO: 0.01 10*3/MM3 (ref 0–0.05)
IMM GRANULOCYTES NFR BLD AUTO: 0.2 % (ref 0–0.5)
LYMPHOCYTES # BLD AUTO: 1.56 10*3/MM3 (ref 0.7–3.1)
LYMPHOCYTES NFR BLD AUTO: 29.9 % (ref 19.6–45.3)
MAGNESIUM SERPL-MCNC: 1.7 MG/DL (ref 1.6–2.4)
MCH RBC QN AUTO: 33.3 PG (ref 26.6–33)
MCHC RBC AUTO-ENTMCNC: 33.7 G/DL (ref 31.5–35.7)
MCV RBC AUTO: 98.7 FL (ref 79–97)
MONOCYTES # BLD AUTO: 0.53 10*3/MM3 (ref 0.1–0.9)
MONOCYTES NFR BLD AUTO: 10.2 % (ref 5–12)
NEUTROPHILS NFR BLD AUTO: 3.01 10*3/MM3 (ref 1.7–7)
NEUTROPHILS NFR BLD AUTO: 57.8 % (ref 42.7–76)
NRBC BLD AUTO-RTO: 0 /100 WBC (ref 0–0.2)
NT-PROBNP SERPL-MCNC: <36 PG/ML (ref 0–1800)
PLATELET # BLD AUTO: 204 10*3/MM3 (ref 140–450)
PMV BLD AUTO: 10 FL (ref 6–12)
POTASSIUM SERPL-SCNC: 5.2 MMOL/L (ref 3.5–5.2)
PROT SERPL-MCNC: 6.8 G/DL (ref 6–8.5)
QT INTERVAL: 370 MS
QTC INTERVAL: 403 MS
RBC # BLD AUTO: 4.48 10*6/MM3 (ref 4.14–5.8)
SODIUM SERPL-SCNC: 140 MMOL/L (ref 136–145)
T4 FREE SERPL-MCNC: 1.39 NG/DL (ref 0.93–1.7)
TROPONIN T DELTA: -1 NG/L
TROPONIN T SERPL HS-MCNC: 15 NG/L
TSH SERPL DL<=0.05 MIU/L-ACNC: 2.83 UIU/ML (ref 0.27–4.2)
WBC NRBC COR # BLD: 5.21 10*3/MM3 (ref 3.4–10.8)
WHOLE BLOOD HOLD COAG: NORMAL
WHOLE BLOOD HOLD SPECIMEN: NORMAL

## 2023-09-29 PROCEDURE — 83880 ASSAY OF NATRIURETIC PEPTIDE: CPT | Performed by: EMERGENCY MEDICINE

## 2023-09-29 PROCEDURE — 93005 ELECTROCARDIOGRAM TRACING: CPT | Performed by: EMERGENCY MEDICINE

## 2023-09-29 PROCEDURE — 99283 EMERGENCY DEPT VISIT LOW MDM: CPT

## 2023-09-29 PROCEDURE — 80053 COMPREHEN METABOLIC PANEL: CPT | Performed by: EMERGENCY MEDICINE

## 2023-09-29 PROCEDURE — 84443 ASSAY THYROID STIM HORMONE: CPT | Performed by: EMERGENCY MEDICINE

## 2023-09-29 PROCEDURE — 93242 EXT ECG>48HR<7D RECORDING: CPT

## 2023-09-29 PROCEDURE — 84439 ASSAY OF FREE THYROXINE: CPT | Performed by: EMERGENCY MEDICINE

## 2023-09-29 PROCEDURE — 84484 ASSAY OF TROPONIN QUANT: CPT | Performed by: EMERGENCY MEDICINE

## 2023-09-29 PROCEDURE — 36415 COLL VENOUS BLD VENIPUNCTURE: CPT

## 2023-09-29 PROCEDURE — 83735 ASSAY OF MAGNESIUM: CPT | Performed by: EMERGENCY MEDICINE

## 2023-09-29 PROCEDURE — 85025 COMPLETE CBC W/AUTO DIFF WBC: CPT | Performed by: EMERGENCY MEDICINE

## 2023-09-29 RX ORDER — CHLORDIAZEPOXIDE HYDROCHLORIDE AND CLIDINIUM BROMIDE 5; 2.5 MG/1; MG/1
1 CAPSULE ORAL AS NEEDED
COMMUNITY

## 2023-09-29 RX ORDER — CETIRIZINE HYDROCHLORIDE 10 MG/1
10 TABLET ORAL DAILY
COMMUNITY

## 2023-09-29 RX ORDER — TAMSULOSIN HYDROCHLORIDE 0.4 MG/1
1 CAPSULE ORAL DAILY
COMMUNITY

## 2023-09-29 NOTE — DISCHARGE INSTRUCTIONS
Follow-up with cardiology on the results of your Holter monitor, call them on Monday to set up an appointment.  I would recommend increasing your water intake up to 4 to 5 glasses of water a day.  Close follow-up with your primary care provider for recheck, ED return for worsening symptoms as needed

## 2023-09-29 NOTE — ED PROVIDER NOTES
EMERGENCY DEPARTMENT ENCOUNTER    Room Number:  17/17  PCP: Andreas Ford MD  Historian: Patient      HPI:  Chief Complaint: Positional dizziness  A complete HPI/ROS/PMH/PSH/SH/FH are unobtainable due to: None    Context: Capo Gonzales is a 78 y.o. male who presents to the ED via [facility] from urgent care where he was sent over for evaluation for dizziness and PVCs.  Patient has had the symptoms for 7 months, symptoms are worse when he changes position and improves with rest.  Denies any current dizziness.  No chest pain, shortness of breath, nausea, vomit, diarrhea.  No headache.  No falls or head injuries.  Has previously had a Holter monitor for palpitations without significant findings other than PVCs.  Scheduled to see cardiology in November.      MEDICAL RECORD REVIEW    External (non-ED) record review: Holter monitor results from March 15, 2023 shows frequent PVCs    PAST MEDICAL HISTORY  Active Ambulatory Problems     Diagnosis Date Noted    Acute midline low back pain without sciatica 08/11/2018     Resolved Ambulatory Problems     Diagnosis Date Noted    No Resolved Ambulatory Problems     Past Medical History:   Diagnosis Date    Arthritis     Diabetes mellitus     Hyperlipidemia     Hypertension          PAST SURGICAL HISTORY  History reviewed. No pertinent surgical history.      FAMILY HISTORY  History reviewed. No pertinent family history.      SOCIAL HISTORY  Social History     Socioeconomic History    Marital status:    Tobacco Use    Smoking status: Never    Smokeless tobacco: Never   Substance and Sexual Activity    Alcohol use: No    Sexual activity: Defer         ALLERGIES  Patient has no known allergies.        REVIEW OF SYSTEMS  Review of Systems     All systems reviewed and negative except for those discussed in HPI.       PHYSICAL EXAM    I have reviewed the triage vital signs and nursing notes.    ED Triage Vitals [09/29/23 1513]   Temp Heart Rate Resp BP SpO2   97.4 °F  (36.3 °C) 90 18 145/84 96 %      Temp src Heart Rate Source Patient Position BP Location FiO2 (%)   Tympanic Monitor -- -- --       Physical Exam  General: No acute distress, nontoxic  HEENT: Mucous membranes moist, atraumatic, EOMI  Neck: Full ROM  Pulm: Symmetric chest rise, nonlabored, lungs CTAB  Cardiovascular: Regular rate and rhythm, intact distal pulses  GI: Soft, nontender, nondistended, no rebound, no guarding, bowel sounds present  MSK: Full ROM, no deformity  Skin: Warm, dry  Neuro: Awake, alert, oriented x 4, GCS 15, moving all extremities, no dysarthria aphasia, no facial droop, 5 out of 5 strength and sensation bilateral upper and lower extremities, no ataxia, no focal deficits  Psych: Calm, cooperative        LAB RESULTS  Recent Results (from the past 24 hour(s))   Comprehensive Metabolic Panel    Collection Time: 09/29/23  3:14 PM    Specimen: Blood   Result Value Ref Range    Glucose 108 (H) 65 - 99 mg/dL    BUN 16 8 - 23 mg/dL    Creatinine 1.09 0.76 - 1.27 mg/dL    Sodium 140 136 - 145 mmol/L    Potassium 5.2 3.5 - 5.2 mmol/L    Chloride 103 98 - 107 mmol/L    CO2 27.1 22.0 - 29.0 mmol/L    Calcium 9.8 8.6 - 10.5 mg/dL    Total Protein 6.8 6.0 - 8.5 g/dL    Albumin 4.5 3.5 - 5.2 g/dL    ALT (SGPT) 24 1 - 41 U/L    AST (SGOT) 23 1 - 40 U/L    Alkaline Phosphatase 38 (L) 39 - 117 U/L    Total Bilirubin 0.5 0.0 - 1.2 mg/dL    Globulin 2.3 gm/dL    A/G Ratio 2.0 g/dL    BUN/Creatinine Ratio 14.7 7.0 - 25.0    Anion Gap 9.9 5.0 - 15.0 mmol/L    eGFR 69.5 >60.0 mL/min/1.73   BNP    Collection Time: 09/29/23  3:14 PM    Specimen: Blood   Result Value Ref Range    proBNP <36.0 0.0 - 1,800.0 pg/mL   High Sensitivity Troponin T    Collection Time: 09/29/23  3:14 PM    Specimen: Blood   Result Value Ref Range    HS Troponin T 15 (H) <15 ng/L   Magnesium    Collection Time: 09/29/23  3:14 PM    Specimen: Blood   Result Value Ref Range    Magnesium 1.7 1.6 - 2.4 mg/dL   TSH    Collection Time: 09/29/23  3:14  PM    Specimen: Blood   Result Value Ref Range    TSH 2.830 0.270 - 4.200 uIU/mL   T4, Free    Collection Time: 09/29/23  3:14 PM    Specimen: Blood   Result Value Ref Range    Free T4 1.39 0.93 - 1.70 ng/dL   CBC Auto Differential    Collection Time: 09/29/23  3:14 PM    Specimen: Blood   Result Value Ref Range    WBC 5.21 3.40 - 10.80 10*3/mm3    RBC 4.48 4.14 - 5.80 10*6/mm3    Hemoglobin 14.9 13.0 - 17.7 g/dL    Hematocrit 44.2 37.5 - 51.0 %    MCV 98.7 (H) 79.0 - 97.0 fL    MCH 33.3 (H) 26.6 - 33.0 pg    MCHC 33.7 31.5 - 35.7 g/dL    RDW 13.0 12.3 - 15.4 %    RDW-SD 46.6 37.0 - 54.0 fl    MPV 10.0 6.0 - 12.0 fL    Platelets 204 140 - 450 10*3/mm3    Neutrophil % 57.8 42.7 - 76.0 %    Lymphocyte % 29.9 19.6 - 45.3 %    Monocyte % 10.2 5.0 - 12.0 %    Eosinophil % 1.5 0.3 - 6.2 %    Basophil % 0.4 0.0 - 1.5 %    Immature Grans % 0.2 0.0 - 0.5 %    Neutrophils, Absolute 3.01 1.70 - 7.00 10*3/mm3    Lymphocytes, Absolute 1.56 0.70 - 3.10 10*3/mm3    Monocytes, Absolute 0.53 0.10 - 0.90 10*3/mm3    Eosinophils, Absolute 0.08 0.00 - 0.40 10*3/mm3    Basophils, Absolute 0.02 0.00 - 0.20 10*3/mm3    Immature Grans, Absolute 0.01 0.00 - 0.05 10*3/mm3    nRBC 0.0 0.0 - 0.2 /100 WBC   Green Top (Gel)    Collection Time: 09/29/23  3:14 PM   Result Value Ref Range    Extra Tube Hold for add-ons.    Lavender Top    Collection Time: 09/29/23  3:14 PM   Result Value Ref Range    Extra Tube hold for add-on    Gold Top - SST    Collection Time: 09/29/23  3:14 PM   Result Value Ref Range    Extra Tube Hold for add-ons.    Light Blue Top    Collection Time: 09/29/23  3:14 PM   Result Value Ref Range    Extra Tube Hold for add-ons.    ECG 12 Lead Other; dizziness    Collection Time: 09/29/23  3:15 PM   Result Value Ref Range    QT Interval 370 ms    QTC Interval 403 ms   High Sensitivity Troponin T 2Hr    Collection Time: 09/29/23  5:17 PM    Specimen: Arm, Left; Blood   Result Value Ref Range    HS Troponin T 14 <15 ng/L     Troponin T Delta -1 >=-4 - <+4 ng/L       Ordered the above labs and independently interpreted results. My findings will be discussed in the medical decision making section below        RADIOLOGY  No Radiology Exams Resulted Within Past 24 Hours    Ordered the above noted radiological studies.  Independently interpreted by me and my independent review of findings can be found in the ED Course.  See dictation for official radiology interpretation.      PROCEDURES    Procedures  EKG - Per my independent interpretation:    EKG Time: 1515  Rhythm/Rate: Sinus rhythm with rate of 71  Normal axis  Normal intervals  No acute ischemic changes  No STEMI       No prior for visual comparison      MEDICATIONS GIVEN IN ER    Medications - No data to display      PROGRESS, DATA ANALYSIS, CONSULTS, AND MEDICAL DECISION MAKING    Please note that this section constitutes my independent interpretation of clinical data including lab results, radiology, EKG's.  This constitutes my independent professional opinion regarding differential diagnosis and management of this patient.  It may include any factors such as history from outside sources, review of external records, social determinants of health, management of medications, response to those treatments, and discussions with other providers.    Differential Diagnosis and Plan: Initial concern for orthostatic hypotension, BPPV, symptomatic PVCs, dehydration, renal failure, electrolyte abnormalities, anemia, among others.  No significant concern at this point for TIA or CVA, ACS, among others.  Plan for labs, EKG, and reevaluation with results.    Additional sources:  - Discussed/ obtained information from independent historians:       - Chronic or social conditions impacting care:      - Shared decision making:  Patient fully updated on and in agreement with the course and plan moving forward    ED Course as of 09/29/23 1804   Fri Sep 29, 2023   1528 WBC: 5.21 [DC]   1528 Hemoglobin:  14.9 [DC]   1528 Platelets: 204 [DC]   1549 Glucose(!): 108 [DC]   1549 BUN: 16 [DC]   1549 Creatinine: 1.09 [DC]   1549 Sodium: 140 [DC]   1549 Potassium: 5.2 [DC]   1549 ALT (SGPT): 24 [DC]   1549 AST (SGOT): 23 [DC]   1549 Alkaline Phosphatase(!): 38 [DC]   1549 Total Bilirubin: 0.5 [DC]   1549 Magnesium: 1.7 [DC]   1604 proBNP: <36.0 [DC]   1604 HS Troponin T(!): 15 [DC]   1604 Free T4: 1.39 [DC]   1604 TSH Baseline: 2.830 [DC]   1745 HS Troponin T: 14 [DC]   1745 Troponin T Delta: -1 [DC]   1750 Two set negative troponin, patient asymptomatic, intermittent PVCs and bigeminy.  Plan for repeat Holter monitor and outpatient cardiology follow-up.  All questions and concerns addressed, ED return for worsening symptoms as needed.  Advised increased hydration as I suspect this may be contributing to his positional dizziness. [DC]      ED Course User Index  [DC] Bryson Webb MD       Hospitalization Considered?:     Orders Placed During This Visit:  Orders Placed This Encounter   Procedures    Comprehensive Metabolic Panel    BNP    High Sensitivity Troponin T    Magnesium    TSH    T4, Free    CBC Auto Differential    Lohman Draw    High Sensitivity Troponin T 2Hr    Orthostatic Vitals    Holter Monitor - 72 Hour Up To 15 Days    ECG 12 Lead Other; dizziness    SCANNED - TELEMETRY      CBC & Differential    Green Top (Gel)    Lavender Top    Gold Top - SST    Light Blue Top       Additional orders considered but not placed:      Independent interpretation of labs, radiology studies, and discussions with consultants: See ED Course        AS OF 18:04 EDT VITALS:    BP - 141/89  HR - 76  TEMP - 97.4 °F (36.3 °C) (Tympanic)  02 SATS - 95%        DIAGNOSIS  Final diagnoses:   Postural dizziness   PVC's (premature ventricular contractions)         DISPOSITION  DISCHARGE    Patient discharged in stable condition.    Reviewed implications of results, diagnosis, meds, responsibility to follow up, warning signs and symptoms  of possible worsening, potential complications and reasons to return to ER. If your blood pressure > 120/80 please follow up with your primary care provider for further management.    Patient/Family voiced understanding of above instructions.    Discussed plan for discharge, as there is no emergent indication for admission. Pt/family is agreeable and understands need for follow up and repeat testing.  Pt is aware that discharge does not mean that nothing is wrong but it indicates no emergency is present that requires admission and they must continue care with follow-up as given below or physician of their choice.     FOLLOW-UP  [facility] EMERGENCY DEPARTMENT  [address] [facility] [address] [phone]    As needed, If symptoms worsen    Andreas Ford MD  [address] [id number] [phone]    Schedule an appointment as soon as possible for a visit   for close recheck within 1-2 weeks    [facility] MEDICAL [facility] CARDIOLOGY  [address]  [address]  [facility] [address] [phone]  Schedule an appointment as soon as possible for a visit   for follow up on holter monitor results         Medication List      No changes were made to your prescriptions during this visit.                       --    Please note that portions of this were completed with a voice recognition program.       Note Disclaimer: At [facility], we believe that sharing information builds trust and better relationships. You are receiving this note because you are receiving care at [facility] or recently visited. It is possible you will see health information before a provider has talked with you about it. This kind of information can be easy to misunderstand. To help you fully understand what it means for your health, we urge you to discuss this note with your provider.           Bryson Webb MD  09/29/23 0175

## 2023-10-03 ENCOUNTER — OFFICE VISIT (OUTPATIENT)
Dept: CARDIOLOGY | Facility: CLINIC | Age: 78
End: 2023-10-03
Payer: MEDICARE

## 2023-10-03 VITALS
HEART RATE: 87 BPM | DIASTOLIC BLOOD PRESSURE: 74 MMHG | HEIGHT: 70 IN | WEIGHT: 193.8 LBS | OXYGEN SATURATION: 98 % | BODY MASS INDEX: 27.75 KG/M2 | SYSTOLIC BLOOD PRESSURE: 130 MMHG

## 2023-10-03 DIAGNOSIS — R42 DIZZINESS: Primary | ICD-10-CM

## 2023-10-03 PROBLEM — E78.2 MIXED HYPERLIPIDEMIA: Status: ACTIVE | Noted: 2023-10-03

## 2023-10-03 PROBLEM — I10 ESSENTIAL HYPERTENSION: Status: ACTIVE | Noted: 2023-10-03

## 2023-10-03 PROCEDURE — 99204 OFFICE O/P NEW MOD 45 MIN: CPT | Performed by: INTERNAL MEDICINE

## 2023-10-03 PROCEDURE — 3075F SYST BP GE 130 - 139MM HG: CPT | Performed by: INTERNAL MEDICINE

## 2023-10-03 PROCEDURE — 3078F DIAST BP <80 MM HG: CPT | Performed by: INTERNAL MEDICINE

## 2023-10-03 NOTE — PROGRESS NOTES
PATIENTINFORMATION    Date of Office Visit: 10/03/2023  Encounter Provider: Lius Bey MD  Place of Service: [facility] CARDIOLOGY  Patient Name: Capo Gonzales  : 1945    Subjective:     Encounter Date:10/03/2023      Patient ID: Capo Gonzales is a 78 y.o. male.    No chief complaint on file.    HPI  Mr. Gonzales is a 78 years old gentleman who came to cardiology clinic for further evaluation treatment of palpitations and dizziness.  He has had dizziness that he describes as feeling 'woozy' and nauseous intermittently regardless of time of the day or body position.  He could get his sidedly while seated or standing.  Episodes are lasting longer and more frequent over the past 2 to 3 weeks.  He denies fainting.  He reports intermittent episodes of palpitations that he describes as his heart slowing down and come with the dizzy spells and also isolated times.  He was evaluated in the ER on 2023 any significant findings and released on Holter monitor that he is still wearing.    He does not exercise regularly but is fairly active and denies exertional chest discomfort.  No significant change in breathing.  Denies any orthopnea, PND, syncope or extremity swelling.  No prior stress testing or coronary angiogram.    No recent medication changes and he has been on losartan/tamsulosin for several years.      ROS  All systems reviewed and negative except as noted in HPI.    Past Medical History:   Diagnosis Date    Arthritis     Diabetes mellitus     Hyperlipidemia     Hypertension        History reviewed. No pertinent surgical history.    Social History     Socioeconomic History    Marital status:    Tobacco Use    Smoking status: Never    Smokeless tobacco: Never   Substance and Sexual Activity    Alcohol use: No    Sexual activity: Defer       History reviewed. No pertinent family history.      Procedures       Objective:     /74 (BP Location: Right arm, Patient Position:  "Standing)   Pulse 87   Ht 177.8 cm (70\")   Wt 87.9 kg (193 lb 12.8 oz)   SpO2 98%   BMI 27.81 kg/m²  Body mass index is 27.81 kg/m².     Constitutional:       General: Not in acute distress.     Appearance: Well-developed. Not diaphoretic.   Eyes:      Pupils: Pupils are equal, round, and reactive to light.   HENT:      Head: Normocephalic and atraumatic.   Neck:      Thyroid: No thyromegaly.   Pulmonary:      Effort: Pulmonary effort is normal. No respiratory distress.      Breath sounds: Normal breath sounds. No wheezing. No rales.   Chest:      Chest wall: Not tender to palpatation.   Cardiovascular:      Normal rate. Regular rhythm.      No gallop.    Pulses:     Intact distal pulses.   Edema:     Peripheral edema absent.   Abdominal:      General: Bowel sounds are normal. There is no distension.      Palpations: Abdomen is soft.      Tenderness: There is no guarding.   Musculoskeletal: Normal range of motion.         General: No deformity.      Cervical back: Normal range of motion and neck supple. Skin:     General: Skin is warm and dry.      Findings: No rash.   Neurological:      Mental Status: Alert and oriented to person, place, and time.      Cranial Nerves: No cranial nerve deficit.      Deep Tendon Reflexes: Reflexes are normal and symmetric.   Psychiatric:         Judgment: Judgment normal.       Review Of Data: I have reviewed pertinent recent labs, images and documents and pertinent findings included in HPI or assessment below.          Assessment/Plan:         Intermittent dizzy spells-orthostatic vital signs were normal and did not reproduce symptoms during procedure  Essential hypertension status well controlled on losartan  Palpitation-wearing Holter monitor  Hyperlipidemia on fenofibrate  Type II DM on metformin    Review Holter when available.  His symptoms are typical dizzy spells/nauseous intermittently but no necessarily during activity  Cardiovascular examination is unremarkable.  May " consider doing stress testing.  Switch and hypertensive to beta-blocker depending on Holter findings.    Diagnosis and plan of care discussed with patient and verbalized understanding.            Your medication list            Accurate as of October 3, 2023 10:28 AM. If you have any questions, ask your nurse or doctor.                CONTINUE taking these medications        Instructions Last Dose Given Next Dose Due   bimatoprost 0.01 % ophthalmic drops  Commonly known as: LUMIGAN      Administer 1 drop to both eyes Every Night.       cetirizine 10 MG tablet  Commonly known as: zyrTEC      Take 1 tablet by mouth Daily.       clidinium-chlordiazePOXIDE 5-2.5 MG per capsule  Commonly known as: LIBRAX      Take 1 capsule by mouth As Needed for Indigestion.       fenofibrate 160 MG tablet      Take 1 tablet by mouth Daily.       fish oil 1000 MG capsule capsule      Take 1 capsule by mouth 2 (Two) Times a Day.       losartan 50 MG tablet  Commonly known as: COZAAR      Take 1 tablet by mouth Daily.       metFORMIN 500 MG tablet  Commonly known as: GLUCOPHAGE      Take 1 tablet by mouth 2 (Two) Times a Day With Meals.       methylPREDNISolone 4 MG dose pack  Commonly known as: MEDROL      Take as directed on package instructions.       multivitamin with minerals tablet tablet      Take 1 tablet by mouth Daily.       tamsulosin 0.4 MG capsule 24 hr capsule  Commonly known as: FLOMAX      Take 1 capsule by mouth Daily.       vitamin D 1.25 MG (15634 UT) capsule capsule  Commonly known as: ERGOCALCIFEROL      Take 1 capsule by mouth 1 (One) Time Per Week. Takes weekly on Wednesdays                    Luis Bey MD  10/03/23  10:28 EDT